# Patient Record
Sex: FEMALE | Race: WHITE | NOT HISPANIC OR LATINO | Employment: FULL TIME | URBAN - METROPOLITAN AREA
[De-identification: names, ages, dates, MRNs, and addresses within clinical notes are randomized per-mention and may not be internally consistent; named-entity substitution may affect disease eponyms.]

---

## 2019-07-05 ENCOUNTER — HOSPITAL ENCOUNTER (OUTPATIENT)
Dept: ULTRASOUND IMAGING | Facility: HOSPITAL | Age: 39
Discharge: HOME | End: 2019-07-05
Attending: INTERNAL MEDICINE

## 2019-07-05 LAB — ERYTHROCYTE [SEDIMENTATION RATE] IN BLOOD: 14 MM/H (ref 0–20)

## 2019-07-07 LAB
C3 SERPL-MCNC: 125 MG/DL (ref 88–201)
C4 SERPL-MCNC: 24 MG/DL (ref 10–40)
CONV ANA IGG BY ELISA: NORMAL
RHEUMATOID FACT SERPL-ACNC: <10 IU/ML (ref 0–14)

## 2019-10-28 ENCOUNTER — HOSPITAL ENCOUNTER (OUTPATIENT)
Dept: GENERAL RADIOLOGY | Facility: HOSPITAL | Age: 39
Discharge: HOME | End: 2019-10-28
Attending: OBSTETRICS & GYNECOLOGY

## 2020-11-10 ENCOUNTER — HOSPITAL ENCOUNTER (OUTPATIENT)
Dept: GENERAL RADIOLOGY | Facility: HOSPITAL | Age: 40
Discharge: HOME | End: 2020-11-10
Attending: OBSTETRICS & GYNECOLOGY

## 2021-11-09 NOTE — PROGRESS NOTES
Chief Complaint  Establish Care (Pt takes cholesterol meds and needs new PCP)    Subjective      History of Present Illness  Natalya Bynum presents to [facility]     [facility], moved from [place].      Hyperlipidemia, on simvastatin. Needs refills    She is having increased anxiety, she has moved in with her parents and she is in the process of building a house.  She has noticed her hair is falling out and she has increasing fatigue.    She has family history of thyroid disorder and she has been treated in the past for an abnormal TSH but was taken off of her medicine she is uncertain why.          Past Medical History:   • Hyperlipidemia       Allergies  Patient has no known allergies.    Past Surgical History:   • APPENDECTOMY   • HYSTERECTOMY       Social History     Tobacco Use   • Smoking status: Current Every Day Smoker     Packs/day: 0.25     Years: 10.00     Pack years: 2.50     Types: Cigarettes     Start date: 2011   • Smokeless tobacco: Never Used   Vaping Use   • Vaping Use: Never used   Substance Use Topics   • Alcohol use: Never   • Drug use: Never       History reviewed. No pertinent family history.     Health Maintenance Due   Topic Date Due   • Pneumococcal Vaccine 0-64 (1 of 2 - PPSV23) Never done   • TDAP/TD VACCINES (1 - Tdap) Never done   • HEPATITIS C SCREENING  Never done   • LIPID PANEL  Never done          Current Outpatient Medications:   •  ESTROGENS CONJ SYNTHETIC B PO, Take 2 mg by mouth Daily. NOT SURE OF EXACT TYPE OF ESTROGEN , Disp: , Rfl:   •  citalopram (CeleXA) 20 MG tablet, Take 1 tablet by mouth Daily., Disp: 30 tablet, Rfl: 1  •  fenofibrate 160 MG tablet, Take 1 tablet by mouth Every Night., Disp: 90 tablet, Rfl: 1  •  simvastatin (ZOCOR) 40 MG tablet, Take 1 tablet by mouth Every Night., Disp: 90 tablet, Rfl: 1    Immunization History   Administered Date(s) Administered   • COVID-19 (MODERNA) 04/29/2021, 05/27/2021   •  FluLaval/Fluarix/Fluzone >6 11/11/2021       Objective     Vitals:    11/11/21 0821   BP: 142/96   Pulse:    Resp:    Temp:    SpO2:      Body mass index is 38.35 kg/m².     Review of Systems   Constitutional: Negative.    HENT: Negative.    Respiratory: Negative.    Cardiovascular: Negative.    Gastrointestinal: Negative.    Genitourinary: Negative.    Musculoskeletal: Negative.    Neurological: Negative.    Psychiatric/Behavioral: Negative.        Physical Exam  Vitals reviewed.   Constitutional:       Appearance: Normal appearance. She is well-developed.   HENT:      Mouth/Throat:      Pharynx: No oropharyngeal exudate.   Cardiovascular:      Rate and Rhythm: Normal rate and regular rhythm.      Heart sounds: Normal heart sounds. No murmur heard.      Pulmonary:      Effort: Pulmonary effort is normal.      Breath sounds: Normal breath sounds.   Neurological:      Mental Status: She is alert and oriented to person, place, and time.      Cranial Nerves: No cranial nerve deficit.      Motor: No weakness.   Psychiatric:         Mood and Affect: Mood and affect normal.           Result Review :    The following data was reviewed by: SERA Liu on 11/11/2021:       Depression: Not at risk   • PHQ-2 Score: 0                    Assessment and Plan     Diagnoses and all orders for this visit:    1. Encounter for medical examination to establish care (Primary)  -     CBC & Differential    2. Elevated blood-pressure reading, without diagnosis of hypertension  Comments:  she will do a bp diary and call with readings.     3. Mixed hyperlipidemia  Comments:  she has been on meds for year, will check today  Orders:  -     Comprehensive Metabolic Panel  -     Lipid Panel  -     simvastatin (ZOCOR) 40 MG tablet; Take 1 tablet by mouth Every Night.  Dispense: 90 tablet; Refill: 1  -     fenofibrate 160 MG tablet; Take 1 tablet by mouth Every Night.  Dispense: 90 tablet; Refill: 1    4. Screening for cholesterol  level  -     Lipid Panel    5. Screening for thyroid disorder  -     TSH    6. Class 2 obesity due to excess calories with body mass index (BMI) of 38.0 to 38.9 in adult, unspecified whether serious comorbidity present  Comments:  work on dietary changes, may     7. Chronic fatigue  Comments:  Check labs and call with results    8. Hair loss    9. Stress at work    10. Anxiety  -     citalopram (CeleXA) 20 MG tablet; Take 1 tablet by mouth Daily.  Dispense: 30 tablet; Refill: 1    11. Need for influenza vaccination  -     FluLaval/Fluarix/Fluzone >6 Months    Other orders  -     Cancel: FluLaval/Fluarix/Fluzone >6 Months ([id number])            Follow Up     Return in about 6 weeks (around 12/23/2021).    Patient was given instructions and counseling regarding her condition or for health maintenance advice. Please see specific information pulled into the AVS if appropriate.     Natalya Bynum  reports that she has been smoking cigarettes. She started smoking about 10 years ago. She has a 2.50 pack-year smoking history. She has never used smokeless tobacco.. I have educated her on the risk of diseases from using tobacco products such as cancer, COPD and heart disease.     I advised her to quit and she is not willing to quit.    I spent 3  minutes counseling the patient.           SERA Liu

## 2021-11-11 ENCOUNTER — OFFICE VISIT (OUTPATIENT)
Dept: FAMILY MEDICINE CLINIC | Facility: CLINIC | Age: 41
End: 2021-11-11

## 2021-11-11 VITALS
HEART RATE: 80 BPM | DIASTOLIC BLOOD PRESSURE: 96 MMHG | OXYGEN SATURATION: 96 % | TEMPERATURE: 97.8 F | SYSTOLIC BLOOD PRESSURE: 142 MMHG | BODY MASS INDEX: 38.18 KG/M2 | RESPIRATION RATE: 20 BRPM | HEIGHT: 66 IN | WEIGHT: 237.6 LBS

## 2021-11-11 DIAGNOSIS — R53.82 CHRONIC FATIGUE: ICD-10-CM

## 2021-11-11 DIAGNOSIS — Z23 NEED FOR INFLUENZA VACCINATION: ICD-10-CM

## 2021-11-11 DIAGNOSIS — D72.829 LEUKOCYTOSIS, UNSPECIFIED TYPE: ICD-10-CM

## 2021-11-11 DIAGNOSIS — E78.2 MIXED HYPERLIPIDEMIA: ICD-10-CM

## 2021-11-11 DIAGNOSIS — E66.09 CLASS 2 OBESITY DUE TO EXCESS CALORIES WITH BODY MASS INDEX (BMI) OF 38.0 TO 38.9 IN ADULT, UNSPECIFIED WHETHER SERIOUS COMORBIDITY PRESENT: ICD-10-CM

## 2021-11-11 DIAGNOSIS — Z13.29 SCREENING FOR THYROID DISORDER: ICD-10-CM

## 2021-11-11 DIAGNOSIS — L65.9 HAIR LOSS: ICD-10-CM

## 2021-11-11 DIAGNOSIS — Z00.00 ENCOUNTER FOR MEDICAL EXAMINATION TO ESTABLISH CARE: Primary | ICD-10-CM

## 2021-11-11 DIAGNOSIS — Z56.6 STRESS AT WORK: ICD-10-CM

## 2021-11-11 DIAGNOSIS — F41.9 ANXIETY: ICD-10-CM

## 2021-11-11 DIAGNOSIS — R03.0 ELEVATED BLOOD-PRESSURE READING, WITHOUT DIAGNOSIS OF HYPERTENSION: ICD-10-CM

## 2021-11-11 DIAGNOSIS — Z13.220 SCREENING FOR CHOLESTEROL LEVEL: ICD-10-CM

## 2021-11-11 LAB
ALBUMIN SERPL-MCNC: 4.1 G/DL (ref 3.5–5.2)
ALBUMIN/GLOB SERPL: 1.7 G/DL
ALP SERPL-CCNC: 64 U/L (ref 39–117)
ALT SERPL W P-5'-P-CCNC: 21 U/L (ref 1–33)
ANION GAP SERPL CALCULATED.3IONS-SCNC: 9.4 MMOL/L (ref 5–15)
AST SERPL-CCNC: 21 U/L (ref 1–32)
BASOPHILS # BLD AUTO: 0.1 10*3/MM3 (ref 0–0.2)
BASOPHILS NFR BLD AUTO: 0.7 % (ref 0–1.5)
BILIRUB SERPL-MCNC: 0.3 MG/DL (ref 0–1.2)
BUN SERPL-MCNC: 10 MG/DL (ref 6–20)
BUN/CREAT SERPL: 9.7 (ref 7–25)
CALCIUM SPEC-SCNC: 9.3 MG/DL (ref 8.6–10.5)
CHLORIDE SERPL-SCNC: 105 MMOL/L (ref 98–107)
CHOLEST SERPL-MCNC: 197 MG/DL (ref 0–200)
CO2 SERPL-SCNC: 25.6 MMOL/L (ref 22–29)
CREAT SERPL-MCNC: 1.03 MG/DL (ref 0.57–1)
DEPRECATED RDW RBC AUTO: 40.9 FL (ref 37–54)
EOSINOPHIL # BLD AUTO: 0.85 10*3/MM3 (ref 0–0.4)
EOSINOPHIL NFR BLD AUTO: 5.9 % (ref 0.3–6.2)
ERYTHROCYTE [DISTWIDTH] IN BLOOD BY AUTOMATED COUNT: 12.6 % (ref 12.3–15.4)
GFR SERPL CREATININE-BSD FRML MDRD: 59 ML/MIN/1.73
GLOBULIN UR ELPH-MCNC: 2.4 GM/DL
GLUCOSE SERPL-MCNC: 76 MG/DL (ref 65–99)
HCT VFR BLD AUTO: 41.7 % (ref 34–46.6)
HDLC SERPL-MCNC: 31 MG/DL (ref 40–60)
HGB BLD-MCNC: 14.1 G/DL (ref 12–15.9)
IMM GRANULOCYTES # BLD AUTO: 0.06 10*3/MM3 (ref 0–0.05)
IMM GRANULOCYTES NFR BLD AUTO: 0.4 % (ref 0–0.5)
LDLC SERPL CALC-MCNC: 131 MG/DL (ref 0–100)
LDLC/HDLC SERPL: 4.1 {RATIO}
LYMPHOCYTES # BLD AUTO: 3.73 10*3/MM3 (ref 0.7–3.1)
LYMPHOCYTES NFR BLD AUTO: 25.8 % (ref 19.6–45.3)
MCH RBC QN AUTO: 30 PG (ref 26.6–33)
MCHC RBC AUTO-ENTMCNC: 33.8 G/DL (ref 31.5–35.7)
MCV RBC AUTO: 88.7 FL (ref 79–97)
MONOCYTES # BLD AUTO: 0.88 10*3/MM3 (ref 0.1–0.9)
MONOCYTES NFR BLD AUTO: 6.1 % (ref 5–12)
NEUTROPHILS NFR BLD AUTO: 61.1 % (ref 42.7–76)
NEUTROPHILS NFR BLD AUTO: 8.83 10*3/MM3 (ref 1.7–7)
NRBC BLD AUTO-RTO: 0 /100 WBC (ref 0–0.2)
PLATELET # BLD AUTO: 345 10*3/MM3 (ref 140–450)
PMV BLD AUTO: 11.8 FL (ref 6–12)
POTASSIUM SERPL-SCNC: 4.3 MMOL/L (ref 3.5–5.2)
PROT SERPL-MCNC: 6.5 G/DL (ref 6–8.5)
RBC # BLD AUTO: 4.7 10*6/MM3 (ref 3.77–5.28)
SODIUM SERPL-SCNC: 140 MMOL/L (ref 136–145)
TRIGL SERPL-MCNC: 194 MG/DL (ref 0–150)
TSH SERPL DL<=0.05 MIU/L-ACNC: 2.34 UIU/ML (ref 0.27–4.2)
VLDLC SERPL-MCNC: 35 MG/DL (ref 5–40)
WBC # BLD AUTO: 14.45 10*3/MM3 (ref 3.4–10.8)

## 2021-11-11 PROCEDURE — 80053 COMPREHEN METABOLIC PANEL: CPT | Performed by: NURSE PRACTITIONER

## 2021-11-11 PROCEDURE — 80061 LIPID PANEL: CPT | Performed by: NURSE PRACTITIONER

## 2021-11-11 PROCEDURE — 99214 OFFICE O/P EST MOD 30 MIN: CPT | Performed by: NURSE PRACTITIONER

## 2021-11-11 PROCEDURE — 85025 COMPLETE CBC W/AUTO DIFF WBC: CPT | Performed by: NURSE PRACTITIONER

## 2021-11-11 PROCEDURE — 90471 IMMUNIZATION ADMIN: CPT | Performed by: NURSE PRACTITIONER

## 2021-11-11 PROCEDURE — 84443 ASSAY THYROID STIM HORMONE: CPT | Performed by: NURSE PRACTITIONER

## 2021-11-11 PROCEDURE — 90686 IIV4 VACC NO PRSV 0.5 ML IM: CPT | Performed by: NURSE PRACTITIONER

## 2021-11-11 RX ORDER — FENOFIBRATE 160 MG/1
160 TABLET ORAL NIGHTLY
Qty: 90 TABLET | Refills: 1 | Status: SHIPPED | OUTPATIENT
Start: 2021-11-11 | End: 2022-09-13 | Stop reason: SDUPTHER

## 2021-11-11 RX ORDER — SIMVASTATIN 40 MG
40 TABLET ORAL NIGHTLY
Qty: 90 TABLET | Refills: 1 | Status: SHIPPED | OUTPATIENT
Start: 2021-11-11 | End: 2022-09-13 | Stop reason: SDUPTHER

## 2021-11-11 RX ORDER — CITALOPRAM 20 MG/1
20 TABLET ORAL DAILY
Qty: 30 TABLET | Refills: 1 | Status: SHIPPED | OUTPATIENT
Start: 2021-11-11 | End: 2022-01-03

## 2021-11-11 SDOH — HEALTH STABILITY - MENTAL HEALTH: OTHER PHYSICAL AND MENTAL STRAIN RELATED TO WORK: Z56.6

## 2021-11-16 PROBLEM — D72.829 LEUCOCYTOSIS: Status: ACTIVE | Noted: 2020-11-12

## 2021-11-17 ENCOUNTER — TELEPHONE (OUTPATIENT)
Dept: FAMILY MEDICINE CLINIC | Facility: CLINIC | Age: 41
End: 2021-11-17

## 2021-11-17 NOTE — TELEPHONE ENCOUNTER
ROUTING BACK TO AMBULATORY, PT WANTS TO WAIT 6 MONTHS AND RECHECK AND THEN SEE IF SHE STILL NEEDS A REFERRAL IS THIS OK, PLEASE ADVISE, [facility]/HUB 11-17-21.  (REGARDING HEMATOLOGY/ONCOLOGY REFERRAL)

## 2021-12-20 ENCOUNTER — TELEPHONE (OUTPATIENT)
Dept: FAMILY MEDICINE CLINIC | Facility: CLINIC | Age: 41
End: 2021-12-20

## 2021-12-20 DIAGNOSIS — F41.9 ANXIETY: ICD-10-CM

## 2021-12-20 RX ORDER — CITALOPRAM 20 MG/1
20 TABLET ORAL DAILY
Qty: 30 TABLET | Refills: 1 | Status: CANCELLED | OUTPATIENT
Start: 2021-12-20

## 2021-12-20 NOTE — TELEPHONE ENCOUNTER
Caller: Natalya Bynum    Relationship: Self    Best call back number: [phone]     What medication are you requesting:   CELEXA 40 MG         If a prescription is needed, what is your preferred pharmacy and phone number: [facility] [address] [place] [address], [place] - [address] AT [facility] [address] - [phone] [facility] [phone] FX     Additional notes: PLEASE CALL AND ADVISE. PATIENT IS WANTING TO CHANGE HER DOSE.

## 2022-01-03 ENCOUNTER — OFFICE VISIT (OUTPATIENT)
Dept: FAMILY MEDICINE CLINIC | Facility: CLINIC | Age: 42
End: 2022-01-03

## 2022-01-03 VITALS
HEART RATE: 65 BPM | BODY MASS INDEX: 38.92 KG/M2 | DIASTOLIC BLOOD PRESSURE: 97 MMHG | OXYGEN SATURATION: 99 % | HEIGHT: 65 IN | TEMPERATURE: 98.1 F | SYSTOLIC BLOOD PRESSURE: 152 MMHG | WEIGHT: 233.6 LBS

## 2022-01-03 DIAGNOSIS — F41.9 ANXIETY: ICD-10-CM

## 2022-01-03 DIAGNOSIS — D72.829 LEUKOCYTOSIS, UNSPECIFIED TYPE: ICD-10-CM

## 2022-01-03 DIAGNOSIS — G47.09 OTHER INSOMNIA: Primary | ICD-10-CM

## 2022-01-03 PROBLEM — E78.2 MIXED HYPERLIPIDEMIA: Chronic | Status: ACTIVE | Noted: 2021-11-11

## 2022-01-03 PROBLEM — D72.820 LYMPHOCYTOSIS: Chronic | Status: ACTIVE | Noted: 2020-11-12

## 2022-01-03 PROBLEM — D72.820 LYMPHOCYTOSIS: Chronic | Status: RESOLVED | Noted: 2020-11-12 | Resolved: 2022-01-03

## 2022-01-03 PROBLEM — D72.820 LYMPHOCYTOSIS: Status: ACTIVE | Noted: 2020-11-12

## 2022-01-03 PROCEDURE — 99213 OFFICE O/P EST LOW 20 MIN: CPT | Performed by: NURSE PRACTITIONER

## 2022-01-03 RX ORDER — TRAZODONE HYDROCHLORIDE 50 MG/1
50 TABLET ORAL NIGHTLY
Qty: 30 TABLET | Refills: 2 | Status: SHIPPED | OUTPATIENT
Start: 2022-01-03 | End: 2022-09-13

## 2022-01-03 RX ORDER — CITALOPRAM 40 MG/1
40 TABLET ORAL DAILY
Qty: 30 TABLET | Refills: 2 | Status: SHIPPED | OUTPATIENT
Start: 2022-01-03 | End: 2022-09-13 | Stop reason: SDUPTHER

## 2022-01-03 NOTE — PROGRESS NOTES
Chief Complaint  Follow-up (Medication)    Subjective      History of Present Illness  Natalya Bynum presents to [facility]     F/u on anxiety/depression. She is losing her job in march. She felt like the celexa helped some but would like the dose increased.     Hx of leukocytosis. Last wbc was 14. She is not interested in any further follow up.     Insomnia, she is asking for something to help her sleep. She thinks it contributes to her anxiety/depression.     Natalya Bynum  reports that she has been smoking cigarettes. She started smoking about 11 years ago. She has a 2.50 pack-year smoking history. She has never used smokeless tobacco.. I have educated her on the risk of diseases from using tobacco products such as cancer, COPD and heart disease.     I advised her to quit and she is not willing to quit.    I spent 4 minutes counseling the patient.         Allergies  Patient has no known allergies.    Objective     Vitals:    01/03/22 1107   BP: 152/97   Pulse: 65   Temp: 98.1 °F (36.7 °C)   SpO2: 99%     Body mass index is 38.87 kg/m².     Review of Systems    Physical Exam  Vitals reviewed.   Constitutional:       Appearance: Normal appearance. She is well-developed.   Cardiovascular:      Rate and Rhythm: Normal rate and regular rhythm.      Heart sounds: Normal heart sounds. No murmur heard.      Pulmonary:      Effort: Pulmonary effort is normal.      Breath sounds: Normal breath sounds.   Neurological:      Mental Status: She is alert and oriented to person, place, and time.      Cranial Nerves: No cranial nerve deficit.      Motor: No weakness.   Psychiatric:         Mood and Affect: Mood and affect normal.              Result Review :    The following data was reviewed by: SERA Liu on 01/03/2022:       Depression: Not at risk   • PHQ-2 Score: 0       Common labs    Common Labsle 11/11/21 11/11/21 11/11/21    0900 0900 0900   Glucose   76   BUN    10   Creatinine   1.03 (A)   eGFR Non African Am   59 (A)   Sodium   140   Potassium   4.3   Chloride   105   Calcium   9.3   Albumin   4.10   Total Bilirubin   0.3   Alkaline Phosphatase   64   AST (SGOT)   21   ALT (SGPT)   21   WBC 14.45 (A)     Hemoglobin 14.1     Hematocrit 41.7     Platelets 345     Total Cholesterol  197    Triglycerides  194 (A)    HDL Cholesterol  31 (A)    LDL Cholesterol   131 (A)    (A) Abnormal value                            Assessment and Plan     Diagnoses and all orders for this visit:    1. Other insomnia (Primary)  Comments:  she takes melatonin without improvement, benadryl makes her stay up.   Orders:  -     traZODone (DESYREL) 50 MG tablet; Take 1 tablet by mouth Every Night.  Dispense: 30 tablet; Refill: 2    2. Anxiety  Comments:  will increase the celexa, f/u with me in a month if not improving.   Orders:  -     citalopram (CeleXA) 40 MG tablet; Take 1 tablet by mouth Daily.  Dispense: 30 tablet; Refill: 2    3. Leukocytosis, unspecified type  Comments:  has been high for 10 years per patient.  She declines hematology consult. Declines repeat labs today.             Follow Up     Return in about 3 months (around 4/3/2022).    Patient was given instructions and counseling regarding her condition or for health maintenance advice. Please see specific information pulled into the AVS if appropriate.     SERA Liu

## 2022-01-19 ENCOUNTER — OFFICE VISIT (OUTPATIENT)
Dept: OBSTETRICS AND GYNECOLOGY | Facility: CLINIC | Age: 42
End: 2022-01-19

## 2022-01-19 VITALS
WEIGHT: 238.6 LBS | HEIGHT: 63 IN | HEART RATE: 74 BPM | DIASTOLIC BLOOD PRESSURE: 86 MMHG | BODY MASS INDEX: 42.28 KG/M2 | SYSTOLIC BLOOD PRESSURE: 140 MMHG

## 2022-01-19 DIAGNOSIS — E66.01 MORBID OBESITY WITH BMI OF 40.0-44.9, ADULT: ICD-10-CM

## 2022-01-19 DIAGNOSIS — N95.1 VASOMOTOR SYMPTOMS DUE TO MENOPAUSE: ICD-10-CM

## 2022-01-19 DIAGNOSIS — Z01.419 WELL WOMAN EXAM: Primary | ICD-10-CM

## 2022-01-19 DIAGNOSIS — A63.0 GENITAL WARTS: ICD-10-CM

## 2022-01-19 DIAGNOSIS — Z72.0 TOBACCO USE: ICD-10-CM

## 2022-01-19 PROCEDURE — 87624 HPV HI-RISK TYP POOLED RSLT: CPT | Performed by: OBSTETRICS & GYNECOLOGY

## 2022-01-19 PROCEDURE — G0123 SCREEN CERV/VAG THIN LAYER: HCPCS | Performed by: OBSTETRICS & GYNECOLOGY

## 2022-01-19 PROCEDURE — 99396 PREV VISIT EST AGE 40-64: CPT | Performed by: OBSTETRICS & GYNECOLOGY

## 2022-01-19 RX ORDER — ESTRADIOL 1 MG/1
1 TABLET ORAL DAILY
Qty: 90 TABLET | Refills: 3 | Status: SHIPPED | OUTPATIENT
Start: 2022-01-19 | End: 2022-10-26

## 2022-01-19 RX ORDER — IMIQUIMOD 12.5 MG/.25G
1 CREAM TOPICAL 2 TIMES WEEKLY
Qty: 24 EACH | Refills: 3 | Status: SHIPPED | OUTPATIENT
Start: 2022-01-20 | End: 2022-04-20

## 2022-01-19 NOTE — PROGRESS NOTES
"Well Woman Visit    CC: PHYLICIA     HPI:   42 y.o. who presents for a well woman exam.  She has no complaints today.  She does have a history of a TLH with BSO due to chronic pelvic pain and abnormal Pap smears.  Her menopausal symptoms are well controlled with estrogen replacement therapy.  She is concerned she might be beginning to develop genital warts once again.  She reports she will be losing her insurance soon and would like to try the Aldara cream again.      History: PMHx, Meds, Allergies, PSHx, Social Hx, and POBHx all reviewed and updated.    /86 (Cuff Size: Large Adult)   Pulse 74   Ht 160 cm (63\")   Wt 108 kg (238 lb 9.6 oz)   Breastfeeding No   BMI 42.27 kg/m²     Physical Exam  Vitals and nursing note reviewed. Exam conducted with a chaperone present.   Constitutional:       General: She is not in acute distress.     Appearance: Normal appearance. She is obese. She is not ill-appearing.   HENT:      Head: Normocephalic and atraumatic.      Mouth/Throat:      Mouth: Mucous membranes are moist.      Pharynx: Oropharynx is clear.   Eyes:      Extraocular Movements: Extraocular movements intact.   Neck:      Thyroid: No thyroid mass or thyromegaly.   Cardiovascular:      Rate and Rhythm: Regular rhythm.      Heart sounds: No murmur heard.      Pulmonary:      Effort: Pulmonary effort is normal.      Breath sounds: No wheezing.   Chest:   Breasts: Breasts are symmetrical.      Right: Normal. No swelling, bleeding, inverted nipple, mass, nipple discharge, skin change, tenderness, axillary adenopathy or supraclavicular adenopathy.      Left: Normal. No swelling, bleeding, inverted nipple, mass, nipple discharge, skin change, tenderness, axillary adenopathy or supraclavicular adenopathy.       Abdominal:      General: Abdomen is flat. There is no distension.      Palpations: Abdomen is soft. There is no mass.      Tenderness: There is no abdominal tenderness. There is no guarding or rebound.     "  Hernia: A hernia is present. There is no hernia in the left inguinal area or right inguinal area.   Genitourinary:     General: Normal vulva.      Exam position: Lithotomy position.      Pubic Area: No rash.       Labia:         Right: No rash, tenderness, lesion or injury.         Left: No rash, tenderness, lesion or injury.       Urethra: No prolapse, urethral pain, urethral swelling or urethral lesion.      Vagina: No signs of injury and foreign body. Lesions present. No vaginal discharge, erythema, tenderness, bleeding or prolapsed vaginal walls.      Uterus: Absent.       Comments: There is some pinpoint lesions just at the vaginal opening on the left side that could be consistent with early genital warts    The cervix, uterus and bilateral adnexa are surgically absent  Musculoskeletal:      Right lower leg: No edema.      Left lower leg: No edema.   Lymphadenopathy:      Upper Body:      Right upper body: No supraclavicular or axillary adenopathy.      Left upper body: No supraclavicular or axillary adenopathy.      Lower Body: No right inguinal adenopathy. No left inguinal adenopathy.   Skin:     General: Skin is warm and dry.      Findings: No rash.   Neurological:      Mental Status: She is alert and oriented to person, place, and time.   Psychiatric:         Mood and Affect: Mood normal.         Behavior: Behavior normal.         Thought Content: Thought content normal.         ASSESSMENT AND PLAN:      Diagnoses and all orders for this visit:    1. Well woman exam (Primary)  -     Cancel: IGP,rfx Aptima HPV All Pth  -     Mammo Screening Digital Tomosynthesis Bilateral With CAD; Future  -     IGP,rfx Aptima HPV All Pth; Future  -     IGP,rfx Aptima HPV All Pth    2. Genital warts  -     imiquimod (Aldara) 5 % cream; Apply 1 application topically to the appropriate area as directed 2 (Two) Times a Week for 90 days.  Dispense: 24 each; Refill: 3    3. Vasomotor symptoms due to menopause  -     estradiol  (ESTRACE) 1 MG tablet; Take 1 tablet by mouth Daily.  Dispense: 90 tablet; Refill: 3    4. Morbid obesity with BMI of 40.0-44.9, adult (HCC)  Assessment & Plan:  Discussed exercise, nutrition recommended weight loss      5. Tobacco use  Assessment & Plan:  Smoking cessation counseling        Counseling:     HRT R/B/A/SE/E all options including non-FDA approved options reviewed    Preventative:   MMG  Recommend FLU vaccine this season, R/B discussed  Recommend COVID vaccine, R/B discussed    She understands the importance of having any ordered tests to be performed in a timely fashion.  The risks of not performing them include, but are not limited to, advanced cancer stages, bone loss from osteoporosis and/or subsequent increase in morbidity and/or mortality.  She is encouraged to review her results online and/or contact or office if she has questions.     Follow Up:  No follow-ups on file.    Reji Nuñez MD  01/19/2022

## 2022-01-20 PROBLEM — E66.01 MORBID OBESITY WITH BMI OF 40.0-44.9, ADULT: Status: ACTIVE | Noted: 2022-01-20

## 2022-01-20 PROBLEM — Z72.0 TOBACCO USE: Status: ACTIVE | Noted: 2022-01-20

## 2022-01-25 LAB
CONV .: ABNORMAL
CYTOLOGIST CVX/VAG CYTO: ABNORMAL
CYTOLOGY CVX/VAG DOC CYTO: ABNORMAL
CYTOLOGY CVX/VAG DOC THIN PREP: ABNORMAL
DX ICD CODE: ABNORMAL
DX ICD CODE: ABNORMAL
HIV 1 & 2 AB SER-IMP: ABNORMAL
HPV I/H RISK 4 DNA CVX QL PROBE+SIG AMP: POSITIVE
OTHER STN SPEC: ABNORMAL
PATHOLOGIST CVX/VAG CYTO: ABNORMAL
RECOM F/U CVX/VAG CYTO: ABNORMAL
STAT OF ADQ CVX/VAG CYTO-IMP: ABNORMAL

## 2022-01-27 ENCOUNTER — TELEPHONE (OUTPATIENT)
Dept: OBSTETRICS AND GYNECOLOGY | Facility: CLINIC | Age: 42
End: 2022-01-27

## 2022-01-27 NOTE — TELEPHONE ENCOUNTER
Patient notified of results and recommendations. She states she is losing her insurance at the end of the month but hopes to have it back by April due to some changes at her employer. She states she will call back once she knows what is going on with her job/insurance to make that appointment.   Returned call to schedule NP appt, LVM.    ----- Message from [facility] [facility]

## 2022-01-27 NOTE — TELEPHONE ENCOUNTER
----- Message from Reji Nuñez MD sent at 1/25/2022  9:49 PM EST -----  Notify patient of result. Needs colposcopy. Please schedule

## 2022-01-28 ENCOUNTER — HOSPITAL ENCOUNTER (OUTPATIENT)
Dept: MAMMOGRAPHY | Facility: HOSPITAL | Age: 42
Discharge: HOME | End: 2022-01-28
Admitting: OBSTETRICS & GYNECOLOGY

## 2022-01-28 DIAGNOSIS — Z01.419 WELL WOMAN EXAM: ICD-10-CM

## 2022-01-28 PROCEDURE — 77067 SCR MAMMO BI INCL CAD: CPT

## 2022-01-28 PROCEDURE — 77063 BREAST TOMOSYNTHESIS BI: CPT

## 2022-04-22 ENCOUNTER — TELEPHONE (OUTPATIENT)
Dept: OBSTETRICS AND GYNECOLOGY | Facility: CLINIC | Age: 42
End: 2022-04-22

## 2022-04-22 NOTE — TELEPHONE ENCOUNTER
Called patient to schedule colposcopy secondary to abnormal pap done in 1/2022. Patient states it will be the middle of July before she has insurance.  I offered to go ahead and get her scheduled so we would have her on the books. Patient states she will call back and schedule it.

## 2022-09-12 NOTE — PROGRESS NOTES
"Chief Complaint  Hyperlipidemia    Subjective          Natalya MANE Bynum, 42 y.o. female presents to [facility]  History of Present Illness   She presents today for follow-up and to establish care.  She is a previous patient of SERA Liu.    Mixed hyperlipidemia: She is currently on simvastatin 40 mg and fenofibrate 160 mg every evening.    Anxiety: She is currently on Celexa 40 mg once daily but she has been out of her medication for about 2 months.  She states she has difficulty concentrating and staying focused on her work.  She states that she has been worse since she returned to work and she cannot finish an assignment without getting up.  She states she noticed this prior to being out of her Celexa.  PHQ-9 Total Score: 0   Objective   Vital Signs:   /88   Pulse 76   Temp 97.4 °F (36.3 °C)   Resp 20   Ht 160 cm (63\")   Wt 110 kg (242 lb 8 oz)   SpO2 98%   BMI 42.96 kg/m²     Current Outpatient Medications on File Prior to Visit   Medication Sig Dispense Refill   • estradiol (ESTRACE) 1 MG tablet Take 1 tablet by mouth Daily. 90 tablet 3   • [DISCONTINUED] citalopram (CeleXA) 40 MG tablet Take 1 tablet by mouth Daily. 30 tablet 2   • [DISCONTINUED] fenofibrate 160 MG tablet Take 1 tablet by mouth Every Night. 90 tablet 1   • [DISCONTINUED] simvastatin (ZOCOR) 40 MG tablet Take 1 tablet by mouth Every Night. 90 tablet 1   • [DISCONTINUED] traZODone (DESYREL) 50 MG tablet Take 1 tablet by mouth Every Night. 30 tablet 2     No current facility-administered medications on file prior to visit.     Past Medical History:   Diagnosis Date   • Abnormal Pap smear of cervix    • Cervical dysplasia    • Hyperlipidemia    • Migraine       Physical Exam  Vitals reviewed.   Constitutional:       Appearance: Normal appearance. She is well-developed. She is morbidly obese.   Neck:      Thyroid: No thyroid mass, thyromegaly or thyroid tenderness.   Cardiovascular: "      Rate and Rhythm: Normal rate and regular rhythm.      Heart sounds: No murmur heard.    No friction rub. No gallop.   Pulmonary:      Effort: Pulmonary effort is normal.      Breath sounds: Normal breath sounds. No wheezing or rhonchi.   Lymphadenopathy:      Cervical: No cervical adenopathy.   Skin:     General: Skin is warm and dry.   Neurological:      Mental Status: She is alert and oriented to person, place, and time.      Cranial Nerves: No cranial nerve deficit.   Psychiatric:         Mood and Affect: Mood and affect normal.         Behavior: Behavior normal.         Thought Content: Thought content normal. Thought content does not include homicidal or suicidal ideation.         Judgment: Judgment normal.        Result Review :     CMP    CMP 11/11/21   Glucose 76   BUN 10   Creatinine 1.03 (A)   eGFR Non African Am 59 (A)   Sodium 140   Potassium 4.3   Chloride 105   Calcium 9.3   Albumin 4.10   Total Bilirubin 0.3   Alkaline Phosphatase 64   AST (SGOT) 21   ALT (SGPT) 21   (A) Abnormal value            CBC    CBC 11/11/21   WBC 14.45 (A)   RBC 4.70   Hemoglobin 14.1   Hematocrit 41.7   MCV 88.7   MCH 30.0   MCHC 33.8   RDW 12.6   Platelets 345   (A) Abnormal value            Lipid Panel    Lipid Panel 11/11/21   Total Cholesterol 197   Triglycerides 194 (A)   HDL Cholesterol 31 (A)   VLDL Cholesterol 35   LDL Cholesterol  131 (A)   LDL/HDL Ratio 4.10   (A) Abnormal value            TSH    TSH 11/11/21   TSH 2.340                     Assessment and Plan    Diagnoses and all orders for this visit:    1. Mixed hyperlipidemia (Primary)  Assessment & Plan:  Lipid abnormalities are newly identified.  Pharmacotherapy as ordered.  She will return for fasting labs in 1 to 2 weeks.  Lipids will be reassessed in 6 months.    Orders:  -     fenofibrate 160 MG tablet; Take 1 tablet by mouth Every Night.  Dispense: 90 tablet; Refill: 1  -     simvastatin (ZOCOR) 40 MG tablet; Take 1 tablet by mouth Every Night.   Dispense: 90 tablet; Refill: 1  -     Comprehensive Metabolic Panel; Future  -     Lipid Panel; Future    2. Anxiety  Assessment & Plan:  She has been off of Celexa for about 2 months, will restart Celexa but advised her to start at half a tablet for 1 to 2 weeks and then increase to full tablet.  She will follow-up in 4 weeks.    Orders:  -     citalopram (CeleXA) 40 MG tablet; Take 1 tablet by mouth Daily.  Dispense: 90 tablet; Refill: 1    3. Difficulty concentrating  Assessment & Plan:  Having difficulty concentrating, will start her on Wellbutrin  mg once daily in the morning.  She will follow-up in 1 month.    Orders:  -     buPROPion XL (Wellbutrin XL) 150 MG 24 hr tablet; Take 1 tablet by mouth Every Morning.  Dispense: 30 tablet; Refill: 2    4. Class 3 severe obesity with serious comorbidity and body mass index (BMI) of 40.0 to 44.9 in adult, unspecified obesity type (HCC)  Assessment & Plan:  Patient's (Body mass index is 42.96 kg/m².) indicates that they are morbidly obese (BMI > 40 or > 35 with obesity - related health condition) with health conditions that include dyslipidemias . Weight is newly identified. BMI is is above average; BMI management plan is completed. We discussed portion control.         Follow Up   Return in about 4 weeks (around 10/11/2022) for Recheck difficulty concentrating.  Patient was given instructions and counseling regarding her condition or for health maintenance advice. Please see specific information pulled into the AVS if appropriate.

## 2022-09-13 ENCOUNTER — OFFICE VISIT (OUTPATIENT)
Dept: FAMILY MEDICINE CLINIC | Facility: CLINIC | Age: 42
End: 2022-09-13

## 2022-09-13 VITALS
HEIGHT: 63 IN | WEIGHT: 242.5 LBS | BODY MASS INDEX: 42.97 KG/M2 | OXYGEN SATURATION: 98 % | HEART RATE: 76 BPM | TEMPERATURE: 97.4 F | DIASTOLIC BLOOD PRESSURE: 88 MMHG | SYSTOLIC BLOOD PRESSURE: 136 MMHG | RESPIRATION RATE: 20 BRPM

## 2022-09-13 DIAGNOSIS — R41.840 DIFFICULTY CONCENTRATING: ICD-10-CM

## 2022-09-13 DIAGNOSIS — F41.9 ANXIETY: ICD-10-CM

## 2022-09-13 DIAGNOSIS — E66.01 CLASS 3 SEVERE OBESITY WITH SERIOUS COMORBIDITY AND BODY MASS INDEX (BMI) OF 40.0 TO 44.9 IN ADULT, UNSPECIFIED OBESITY TYPE: ICD-10-CM

## 2022-09-13 DIAGNOSIS — E78.2 MIXED HYPERLIPIDEMIA: Primary | Chronic | ICD-10-CM

## 2022-09-13 PROBLEM — E66.813 CLASS 3 SEVERE OBESITY WITH SERIOUS COMORBIDITY AND BODY MASS INDEX (BMI) OF 40.0 TO 44.9 IN ADULT: Status: ACTIVE | Noted: 2022-01-20

## 2022-09-13 PROCEDURE — 99214 OFFICE O/P EST MOD 30 MIN: CPT | Performed by: NURSE PRACTITIONER

## 2022-09-13 RX ORDER — FENOFIBRATE 160 MG/1
160 TABLET ORAL NIGHTLY
Qty: 90 TABLET | Refills: 1 | Status: SHIPPED | OUTPATIENT
Start: 2022-09-13 | End: 2023-03-09

## 2022-09-13 RX ORDER — BUPROPION HYDROCHLORIDE 150 MG/1
150 TABLET ORAL EVERY MORNING
Qty: 30 TABLET | Refills: 2 | Status: SHIPPED | OUTPATIENT
Start: 2022-09-13 | End: 2022-10-13 | Stop reason: SDUPTHER

## 2022-09-13 RX ORDER — ESTRADIOL 1 MG/1
1 TABLET ORAL DAILY
Qty: 90 TABLET | Refills: 1 | Status: CANCELLED | OUTPATIENT
Start: 2022-09-13

## 2022-09-13 RX ORDER — SIMVASTATIN 40 MG
40 TABLET ORAL NIGHTLY
Qty: 90 TABLET | Refills: 1 | Status: SHIPPED | OUTPATIENT
Start: 2022-09-13 | End: 2023-03-09

## 2022-09-13 RX ORDER — CITALOPRAM 40 MG/1
40 TABLET ORAL DAILY
Qty: 90 TABLET | Refills: 1 | Status: SHIPPED | OUTPATIENT
Start: 2022-09-13 | End: 2023-03-09

## 2022-09-13 NOTE — ASSESSMENT & PLAN NOTE
Having difficulty concentrating, will start her on Wellbutrin  mg once daily in the morning.  She will follow-up in 1 month.

## 2022-09-13 NOTE — ASSESSMENT & PLAN NOTE
She has been off of Celexa for about 2 months, will restart Celexa but advised her to start at half a tablet for 1 to 2 weeks and then increase to full tablet.  She will follow-up in 4 weeks.

## 2022-09-13 NOTE — ASSESSMENT & PLAN NOTE
Patient's (Body mass index is 42.96 kg/m².) indicates that they are morbidly obese (BMI > 40 or > 35 with obesity - related health condition) with health conditions that include dyslipidemias . Weight is newly identified. BMI is is above average; BMI management plan is completed. We discussed portion control.

## 2022-09-13 NOTE — ASSESSMENT & PLAN NOTE
Lipid abnormalities are newly identified.  Pharmacotherapy as ordered.  She will return for fasting labs in 1 to 2 weeks.  Lipids will be reassessed in 6 months.

## 2022-09-20 ENCOUNTER — LAB (OUTPATIENT)
Dept: FAMILY MEDICINE CLINIC | Facility: CLINIC | Age: 42
End: 2022-09-20

## 2022-09-20 DIAGNOSIS — E78.2 MIXED HYPERLIPIDEMIA: Chronic | ICD-10-CM

## 2022-09-20 LAB
ALBUMIN SERPL-MCNC: 4.3 G/DL (ref 3.5–5.2)
ALBUMIN/GLOB SERPL: 1.7 G/DL
ALP SERPL-CCNC: 45 U/L (ref 39–117)
ALT SERPL W P-5'-P-CCNC: 15 U/L (ref 1–33)
ANION GAP SERPL CALCULATED.3IONS-SCNC: 9.4 MMOL/L (ref 5–15)
AST SERPL-CCNC: 22 U/L (ref 1–32)
BILIRUB SERPL-MCNC: 0.3 MG/DL (ref 0–1.2)
BUN SERPL-MCNC: 20 MG/DL (ref 6–20)
BUN/CREAT SERPL: 16.5 (ref 7–25)
CALCIUM SPEC-SCNC: 9.3 MG/DL (ref 8.6–10.5)
CHLORIDE SERPL-SCNC: 104 MMOL/L (ref 98–107)
CHOLEST SERPL-MCNC: 174 MG/DL (ref 0–200)
CO2 SERPL-SCNC: 23.6 MMOL/L (ref 22–29)
CREAT SERPL-MCNC: 1.21 MG/DL (ref 0.57–1)
EGFRCR SERPLBLD CKD-EPI 2021: 57.1 ML/MIN/1.73
GLOBULIN UR ELPH-MCNC: 2.5 GM/DL
GLUCOSE SERPL-MCNC: 85 MG/DL (ref 65–99)
HDLC SERPL-MCNC: 36 MG/DL (ref 40–60)
LDLC SERPL CALC-MCNC: 106 MG/DL (ref 0–100)
LDLC/HDLC SERPL: 2.83 {RATIO}
POTASSIUM SERPL-SCNC: 4.6 MMOL/L (ref 3.5–5.2)
PROT SERPL-MCNC: 6.8 G/DL (ref 6–8.5)
SODIUM SERPL-SCNC: 137 MMOL/L (ref 136–145)
TRIGL SERPL-MCNC: 181 MG/DL (ref 0–150)
VLDLC SERPL-MCNC: 32 MG/DL (ref 5–40)

## 2022-09-20 PROCEDURE — 80061 LIPID PANEL: CPT | Performed by: NURSE PRACTITIONER

## 2022-09-20 PROCEDURE — 80053 COMPREHEN METABOLIC PANEL: CPT | Performed by: NURSE PRACTITIONER

## 2022-10-13 ENCOUNTER — OFFICE VISIT (OUTPATIENT)
Dept: FAMILY MEDICINE CLINIC | Facility: CLINIC | Age: 42
End: 2022-10-13

## 2022-10-13 VITALS
DIASTOLIC BLOOD PRESSURE: 91 MMHG | BODY MASS INDEX: 42.59 KG/M2 | HEART RATE: 76 BPM | TEMPERATURE: 97 F | HEIGHT: 63 IN | SYSTOLIC BLOOD PRESSURE: 132 MMHG | OXYGEN SATURATION: 97 % | WEIGHT: 240.4 LBS

## 2022-10-13 DIAGNOSIS — Z72.0 TOBACCO USE: ICD-10-CM

## 2022-10-13 DIAGNOSIS — E66.01 CLASS 3 SEVERE OBESITY WITH SERIOUS COMORBIDITY AND BODY MASS INDEX (BMI) OF 40.0 TO 44.9 IN ADULT, UNSPECIFIED OBESITY TYPE: ICD-10-CM

## 2022-10-13 DIAGNOSIS — E78.2 MIXED HYPERLIPIDEMIA: Chronic | ICD-10-CM

## 2022-10-13 DIAGNOSIS — F41.1 GENERALIZED ANXIETY DISORDER: ICD-10-CM

## 2022-10-13 DIAGNOSIS — R41.840 DIFFICULTY CONCENTRATING: Primary | ICD-10-CM

## 2022-10-13 PROBLEM — Z01.419 WELL WOMAN EXAM: Status: RESOLVED | Noted: 2022-01-19 | Resolved: 2022-10-13

## 2022-10-13 PROCEDURE — 99214 OFFICE O/P EST MOD 30 MIN: CPT | Performed by: NURSE PRACTITIONER

## 2022-10-13 RX ORDER — BUPROPION HYDROCHLORIDE 300 MG/1
300 TABLET ORAL EVERY MORNING
Qty: 30 TABLET | Refills: 5 | Status: SHIPPED | OUTPATIENT
Start: 2022-10-13 | End: 2023-01-16 | Stop reason: SDUPTHER

## 2022-10-13 NOTE — PROGRESS NOTES
"Chief Complaint  Altered Mental Status (Difficulty concentrating one month follow up )    Subjective          Natalya Bynum, 42 y.o. female presents to [facility]  History of Present Illness   She presents today for 1 month follow-up on anxiety and difficulty concentrating.  She was restarted on Celexa on her last visit.  She was also started on Wellbutrin  mg daily.  She states that Wellbutrin is helping with her difficulty concentrating but she states it seems to wear off by the afternoon.    Obesity: We had discussed weight loss management, recommend portion control. She lost 2 pounds since her last visit.  She states she is going to get a treadmill to try to increase exercise.      She does have mixed hyperlipidemia.  She is on fenofibrate and simvastatin.  Her lipid panel was stable.    Natalya Bynum  reports that she has been smoking cigarettes. She started smoking about 11 years ago. She has a 2.50 pack-year smoking history. She has never used smokeless tobacco.. I have educated her on the risk of diseases from using tobacco products such as cancer, COPD, heart disease and high risk for blood clots with HRT.     I advised her to quit and she is not willing to quit.    I spent 6 minutes counseling the patient.    Objective   Vital Signs:   /91 (BP Location: Left arm, Patient Position: Sitting, Cuff Size: Adult)   Pulse 76   Temp 97 °F (36.1 °C)   Ht 160 cm (63\")   Wt 109 kg (240 lb 6.4 oz)   SpO2 97%   BMI 42.58 kg/m²     Current Outpatient Medications on File Prior to Visit   Medication Sig Dispense Refill   • citalopram (CeleXA) 40 MG tablet Take 1 tablet by mouth Daily. 90 tablet 1   • estradiol (ESTRACE) 1 MG tablet Take 1 tablet by mouth Daily. 90 tablet 3   • fenofibrate 160 MG tablet Take 1 tablet by mouth Every Night. 90 tablet 1   • simvastatin (ZOCOR) 40 MG tablet Take 1 tablet by mouth Every Night. 90 tablet 1   • [DISCONTINUED] buPROPion " XL (Wellbutrin XL) 150 MG 24 hr tablet Take 1 tablet by mouth Every Morning. 30 tablet 2     No current facility-administered medications on file prior to visit.     Past Medical History:   Diagnosis Date   • Abnormal Pap smear of cervix    • Cervical dysplasia    • Hyperlipidemia    • Migraine       Physical Exam  Vitals reviewed.   Constitutional:       Appearance: Normal appearance. She is well-developed. She is morbidly obese.   Neck:      Thyroid: No thyroid mass, thyromegaly or thyroid tenderness.   Cardiovascular:      Rate and Rhythm: Normal rate and regular rhythm.      Heart sounds: No murmur heard.    No friction rub. No gallop.   Pulmonary:      Effort: Pulmonary effort is normal.      Breath sounds: Normal breath sounds. No wheezing or rhonchi.   Lymphadenopathy:      Cervical: No cervical adenopathy.   Skin:     General: Skin is warm and dry.   Neurological:      Mental Status: She is alert and oriented to person, place, and time.      Cranial Nerves: No cranial nerve deficit.   Psychiatric:         Mood and Affect: Mood and affect normal.         Behavior: Behavior normal.         Thought Content: Thought content normal. Thought content does not include homicidal or suicidal ideation.         Judgment: Judgment normal.        Result Review :     CMP    CMP 11/11/21 9/20/22   Glucose 76 85   BUN 10 20   Creatinine 1.03 (A) 1.21 (A)   eGFR Non African Am 59 (A)    Sodium 140 137   Potassium 4.3 4.6   Chloride 105 104   Calcium 9.3 9.3   Albumin 4.10 4.30   Total Bilirubin 0.3 0.3   Alkaline Phosphatase 64 45   AST (SGOT) 21 22   ALT (SGPT) 21 15   (A) Abnormal value       Comments are available for some flowsheets but are not being displayed.           Lipid Panel    Lipid Panel 11/11/21 9/20/22   Total Cholesterol 197 174   Triglycerides 194 (A) 181 (A)   HDL Cholesterol 31 (A) 36 (A)   VLDL Cholesterol 35 32   LDL Cholesterol  131 (A) 106 (A)   LDL/HDL Ratio 4.10 2.83   (A) Abnormal value                       Assessment and Plan    Diagnoses and all orders for this visit:    1. Difficulty concentrating (Primary)  Assessment & Plan:  She is improving with Wellbutrin but she is struggling in the afternoon.  We will increase Wellbutrin XL dose to 300 mg every morning.  Advised her to call if she has any side effects to the increased dose.  She will follow-up in 3 months or sooner if needed.    Orders:  -     buPROPion XL (Wellbutrin XL) 300 MG 24 hr tablet; Take 1 tablet by mouth Every Morning.  Dispense: 30 tablet; Refill: 5    2. Generalized anxiety disorder  Assessment & Plan:  Psychological condition is improving with treatment.  Continue current treatment regimen.  Psychological condition  will be reassessed in 3 months.      3. Class 3 severe obesity with serious comorbidity and body mass index (BMI) of 40.0 to 44.9 in adult, unspecified obesity type (HCC)  Assessment & Plan:  Patient's (Body mass index is 42.58 kg/m².) indicates that they are morbidly obese (BMI > 40 or > 35 with obesity - related health condition) with health conditions that include dyslipidemias . Weight is unchanged. BMI is is above average; BMI management plan is completed. We discussed low calorie, low carb based diet program, portion control and increasing exercise.       4. Tobacco use  Assessment & Plan:  I discussed with her the dangers of smoking with hormone replacement therapy and I recommended that she quit smoking.  She states she only smokes about 1 pack/week but she is not ready to completely quit at this time.      5. Mixed hyperlipidemia  Assessment & Plan:  Lipid abnormalities are improving with treatment.  Pharmacotherapy as ordered.  Lipids will be reassessed in 6 months.        Follow Up   Return in about 3 months (around 1/13/2023) for Next scheduled follow up.  Patient was given instructions and counseling regarding her condition or for health maintenance advice. Please see specific information pulled into the AVS if  appropriate.       Macarena Carter, APRN  10/13/2022

## 2022-10-13 NOTE — ASSESSMENT & PLAN NOTE
I discussed with her the dangers of smoking with hormone replacement therapy and I recommended that she quit smoking.  She states she only smokes about 1 pack/week but she is not ready to completely quit at this time.

## 2022-10-13 NOTE — ASSESSMENT & PLAN NOTE
Patient's (Body mass index is 42.58 kg/m².) indicates that they are morbidly obese (BMI > 40 or > 35 with obesity - related health condition) with health conditions that include dyslipidemias . Weight is unchanged. BMI is is above average; BMI management plan is completed. We discussed low calorie, low carb based diet program, portion control and increasing exercise.

## 2022-10-26 ENCOUNTER — OFFICE VISIT (OUTPATIENT)
Dept: OBSTETRICS AND GYNECOLOGY | Facility: CLINIC | Age: 42
End: 2022-10-26

## 2022-10-26 VITALS
BODY MASS INDEX: 42.59 KG/M2 | HEART RATE: 87 BPM | DIASTOLIC BLOOD PRESSURE: 98 MMHG | WEIGHT: 240.4 LBS | HEIGHT: 63 IN | SYSTOLIC BLOOD PRESSURE: 160 MMHG

## 2022-10-26 DIAGNOSIS — R87.622 LGSIL PAP SMEAR OF VAGINA: Primary | ICD-10-CM

## 2022-10-26 DIAGNOSIS — Z72.0 TOBACCO USE: ICD-10-CM

## 2022-10-26 DIAGNOSIS — N95.1 MENOPAUSAL VASOMOTOR SYNDROME: ICD-10-CM

## 2022-10-26 PROCEDURE — 57452 EXAM OF CERVIX W/SCOPE: CPT | Performed by: OBSTETRICS & GYNECOLOGY

## 2022-10-26 PROCEDURE — 99213 OFFICE O/P EST LOW 20 MIN: CPT | Performed by: OBSTETRICS & GYNECOLOGY

## 2022-10-26 RX ORDER — ESTRADIOL 2 MG/1
2 TABLET ORAL DAILY
Qty: 90 TABLET | Refills: 3 | Status: SHIPPED | OUTPATIENT
Start: 2022-10-26 | End: 2023-01-23 | Stop reason: SDUPTHER

## 2022-10-27 PROBLEM — N95.1 MENOPAUSAL VASOMOTOR SYNDROME: Status: ACTIVE | Noted: 2022-10-27

## 2022-10-27 PROBLEM — R87.622 LGSIL PAP SMEAR OF VAGINA: Status: ACTIVE | Noted: 2022-10-27

## 2022-10-27 NOTE — ASSESSMENT & PLAN NOTE
Status post hysterectomy.  Vaginal colposcopy carried out.  No abnormalities noted on today's exam.  No biopsy taken.  Recommend repeat Pap in 1 year.  If abnormal once again then would recommend repeat colposcopy and random biopsies if the colposcopy appears normal.

## 2022-10-27 NOTE — PROGRESS NOTES
"  Colposcopy    Tobacco/Nicotine use:  Yes  Pap result: LGSIL  Consent signed: Yes    CC: Presents for colposcopy     Procedure reviewed in detail.  She understands the potential risks include, but are not limited to, pain, bleeding, and infection.  Her questions have been answered.        Subjective:  Here for colposcopy.  No concerns or problems.    Objective:  /98   Pulse 87   Ht 160 cm (63\")   Wt 109 kg (240 lb 6.4 oz)   BMI 42.58 kg/m²     Physical Exam  Vitals and nursing note reviewed. Exam conducted with a chaperone present.   Constitutional:       General: She is not in acute distress.     Appearance: Normal appearance. She is obese. She is not ill-appearing.   Neck:      Thyroid: No thyroid mass or thyromegaly.   Cardiovascular:      Rate and Rhythm: Regular rhythm.      Heart sounds: No murmur heard.  Pulmonary:      Effort: Pulmonary effort is normal.      Breath sounds: No wheezing.   Chest:   Breasts:     Right: No mass, nipple discharge or tenderness.      Left: No mass, nipple discharge or tenderness.   Abdominal:      General: There is no distension.      Palpations: Abdomen is soft. There is no mass.      Tenderness: There is no abdominal tenderness.   Genitourinary:     General: Normal vulva.      Exam position: Lithotomy position.      Labia:         Right: No rash, tenderness, lesion or injury.         Left: No rash, tenderness, lesion or injury.       Urethra: No prolapse, urethral pain or urethral lesion.      Vagina: Normal. No vaginal discharge, tenderness or prolapsed vaginal walls.      Cervix: Normal.      Uterus: Absent.       Adnexa: Right adnexa normal and left adnexa normal.      Rectum: Normal.            Comments: The uterus and cervix are surgically absent.  Vaginal colposcopy was carried out.  No acetowhite epithelium was noted.  No biopsy was taken.  Musculoskeletal:      Right lower leg: No edema.      Left lower leg: No edema.   Skin:     General: Skin is warm and " dry.      Findings: No rash.   Neurological:      Mental Status: She is alert and oriented to person, place, and time.   Psychiatric:         Mood and Affect: Mood normal.         Behavior: Behavior normal.         Thought Content: Thought content normal.         Judgment: Judgment normal.          Assessment and Plan:  Diagnoses and all orders for this visit:    1. LGSIL Pap smear of vagina (Primary)  Assessment & Plan:  Status post hysterectomy.  Vaginal colposcopy carried out.  No abnormalities noted on today's exam.  No biopsy taken.  Recommend repeat Pap in 1 year.  If abnormal once again then would recommend repeat colposcopy and random biopsies if the colposcopy appears normal.      2. Menopausal vasomotor syndrome  -     estradiol (Estrace) 2 MG tablet; Take 1 tablet by mouth Daily.  Dispense: 90 tablet; Refill: 3    3. Tobacco use  Assessment & Plan:  Recommend smoking cessation          Counseling:    We discussed HPV in detail.  Incidence, transmission, course, remission, progression, types, cervical cancer, genital warts, monitoring, and importance of compliance were reviewed.  A HPV handout was provided if she desired, HPV vaccine was discussed and recommended if appropriate.  Written information was made available.  The vaccine protects against the 2 types of HPV that cause 70% of cervical cancer and 90% of genital warts.  Condoms were recommended, I recommend she quit smoking.  Options reviewed.  I recommend she FU w PCP to assist if unable to do on her own    She understands the need for continued follow up until she is cleared to return to routine screening pap smears.  She understands the importance of follow up and consequences with lack of follow up (i.e. potential for cervical cancer).  Follow up usually ranges every 6months - 12months.      PRECAUTIONS - It is common to have bright red spotting and dark discharge after today.  If she has had cervical biopsies, she is to avoid intercourse or  tampons as directed for 7 days.  She can use OTC pain relievers prn.  She needs to return to office or ER (if after hours/weekends) if she has pelvic pain, bleeding > 1 pad/2 hours, discharge that has a bad vaginal odor or vaginal itching, fever > 101.5, or any other concerns.        Follow Up:  Return for Annual physical.      Reji Nuñez MD  10/26/2022

## 2023-01-16 ENCOUNTER — OFFICE VISIT (OUTPATIENT)
Dept: FAMILY MEDICINE CLINIC | Facility: CLINIC | Age: 43
End: 2023-01-16
Payer: COMMERCIAL

## 2023-01-16 VITALS
HEIGHT: 63 IN | SYSTOLIC BLOOD PRESSURE: 148 MMHG | TEMPERATURE: 97.5 F | HEART RATE: 75 BPM | DIASTOLIC BLOOD PRESSURE: 94 MMHG | BODY MASS INDEX: 43.78 KG/M2 | OXYGEN SATURATION: 99 % | WEIGHT: 247.1 LBS

## 2023-01-16 DIAGNOSIS — E66.01 CLASS 3 SEVERE OBESITY WITH SERIOUS COMORBIDITY AND BODY MASS INDEX (BMI) OF 40.0 TO 44.9 IN ADULT, UNSPECIFIED OBESITY TYPE: ICD-10-CM

## 2023-01-16 DIAGNOSIS — R41.840 DIFFICULTY CONCENTRATING: Primary | ICD-10-CM

## 2023-01-16 DIAGNOSIS — F41.9 ANXIETY: ICD-10-CM

## 2023-01-16 DIAGNOSIS — I10 PRIMARY HYPERTENSION: ICD-10-CM

## 2023-01-16 PROCEDURE — 99214 OFFICE O/P EST MOD 30 MIN: CPT | Performed by: NURSE PRACTITIONER

## 2023-01-16 RX ORDER — CITALOPRAM 40 MG/1
40 TABLET ORAL DAILY
Qty: 90 TABLET | Refills: 1 | Status: CANCELLED | OUTPATIENT
Start: 2023-01-16

## 2023-01-16 RX ORDER — SIMVASTATIN 40 MG
40 TABLET ORAL NIGHTLY
Qty: 90 TABLET | Refills: 1 | Status: CANCELLED | OUTPATIENT
Start: 2023-01-16

## 2023-01-16 RX ORDER — BUPROPION HYDROCHLORIDE 300 MG/1
300 TABLET ORAL EVERY MORNING
Qty: 90 TABLET | Refills: 1 | Status: SHIPPED | OUTPATIENT
Start: 2023-01-16

## 2023-01-16 RX ORDER — FENOFIBRATE 160 MG/1
160 TABLET ORAL NIGHTLY
Qty: 90 TABLET | Refills: 1 | Status: CANCELLED | OUTPATIENT
Start: 2023-01-16

## 2023-01-16 NOTE — ASSESSMENT & PLAN NOTE
Patient's (Body mass index is 43.77 kg/m².) indicates that they are morbidly/severely obese (BMI > 40 or > 35 with obesity - related health condition) with health conditions that include hypertension and dyslipidemias . Weight is unchanged. BMI is is above average; BMI management plan is completed. We discussed portion control and increasing exercise.

## 2023-01-16 NOTE — ASSESSMENT & PLAN NOTE
Hypertension is newly identified.  Dietary sodium restriction.  Weight loss.  Regular aerobic exercise.  Stop smoking.  Ambulatory blood pressure monitoring.  Blood pressure will be reassessed in 3 months.

## 2023-01-16 NOTE — ASSESSMENT & PLAN NOTE
Anxiety is improving on Celexa 40 mg daily, patient will continue current dose.  She will follow-up with me in 3 months.

## 2023-01-16 NOTE — PROGRESS NOTES
"Chief Complaint  Anxiety    Subjective          Natalya MANE Bynum, 43 y.o. female presents to [facility]  History of Present Illness   She presents today for 3 month follow-up on anxiety and difficulty concentrating.  She is doing well on Celexa. We increased Wellbutrin XL dose to 300 mg daily on her last visit. She states she is doing much better and has quit smoking mostly, states only smokes 1 cigarette/day.    Obesity: We had discussed weight loss management, she states she has joined a gym and is exercising routinely.  Her weight appears to have increased but she states she has on heavier clothes today with boots.  She states she weighed herself at home and only weighed 239 this morning.  She states she has been going to the gym and working out.      She does have mixed hyperlipidemia.  She is on fenofibrate and simvastatin.  Her last lipid panel on 9/20/2022 was stable, improving.     Her blood pressure is elevated in the office today.  I rechecked it and it was still elevated at 154/90.  She states she does have family history of hypertension.  She does have previous blood pressures that are elevated as well.  We discussed diet and she states she could decrease her sodium intake.      Tobacco Use: High Risk   • Smoking Tobacco Use: Some Days   • Smokeless Tobacco Use: Never   • Passive Exposure: Not on file      Objective   Vital Signs:   /94   Pulse 75   Temp 97.5 °F (36.4 °C)   Ht 160 cm (63\")   Wt 112 kg (247 lb 1.6 oz)   SpO2 99%   BMI 43.77 kg/m²       Current Outpatient Medications:   •  buPROPion XL (Wellbutrin XL) 300 MG 24 hr tablet, Take 1 tablet by mouth Every Morning., Disp: 90 tablet, Rfl: 1  •  citalopram (CeleXA) 40 MG tablet, Take 1 tablet by mouth Daily., Disp: 90 tablet, Rfl: 1  •  estradiol (Estrace) 2 MG tablet, Take 1 tablet by mouth Daily., Disp: 90 tablet, Rfl: 3  •  fenofibrate 160 MG tablet, Take 1 tablet by mouth Every Night., Disp: 90 " tablet, Rfl: 1  •  simvastatin (ZOCOR) 40 MG tablet, Take 1 tablet by mouth Every Night., Disp: 90 tablet, Rfl: 1   Past Medical History:   Diagnosis Date   • Abnormal Pap smear of cervix    • Cervical dysplasia    • H. pylori infection    • Hyperlipidemia    • Migraine       Physical Exam  Vitals reviewed.   Constitutional:       Appearance: Normal appearance. She is well-developed. She is obese.   Neck:      Thyroid: No thyroid mass, thyromegaly or thyroid tenderness.   Cardiovascular:      Rate and Rhythm: Normal rate and regular rhythm.      Heart sounds: No murmur heard.    No friction rub. No gallop.   Pulmonary:      Effort: Pulmonary effort is normal.      Breath sounds: Normal breath sounds. No wheezing or rhonchi.   Lymphadenopathy:      Cervical: No cervical adenopathy.   Skin:     General: Skin is warm and dry.   Neurological:      Mental Status: She is alert and oriented to person, place, and time.      Cranial Nerves: No cranial nerve deficit.   Psychiatric:         Mood and Affect: Mood and affect normal.         Behavior: Behavior normal.         Thought Content: Thought content normal. Thought content does not include homicidal or suicidal ideation.         Judgment: Judgment normal.        Result Review :     Common labs    Common Labs 9/20/22 9/20/22    [id number]   Glucose  85   BUN  20   Creatinine  1.21 (A)   Sodium  137   Potassium  4.6   Chloride  104   Calcium  9.3   Albumin  4.30   Total Bilirubin  0.3   Alkaline Phosphatase  45   AST (SGOT)  22   ALT (SGPT)  15   Total Cholesterol 174    Triglycerides 181 (A)    HDL Cholesterol 36 (A)    LDL Cholesterol  106 (A)    (A) Abnormal value       Comments are available for some flowsheets but are not being displayed.                     Assessment and Plan    Diagnoses and all orders for this visit:    1. Difficulty concentrating (Primary)  Assessment & Plan:  She is doing much better on the increased dose of Wellbutrin 300 mg daily, will continue  current dose    Orders:  -     buPROPion XL (Wellbutrin XL) 300 MG 24 hr tablet; Take 1 tablet by mouth Every Morning.  Dispense: 90 tablet; Refill: 1    2. Primary hypertension  Assessment & Plan:  Hypertension is newly identified.  Dietary sodium restriction.  Weight loss.  Regular aerobic exercise.  Stop smoking.  Ambulatory blood pressure monitoring.  Blood pressure will be reassessed in 3 months.      3. Anxiety  Assessment & Plan:  Anxiety is improving on Celexa 40 mg daily, patient will continue current dose.  She will follow-up with me in 3 months.      4. Class 3 severe obesity with serious comorbidity and body mass index (BMI) of 40.0 to 44.9 in adult, unspecified obesity type (HCC)  Assessment & Plan:  Patient's (Body mass index is 43.77 kg/m².) indicates that they are morbidly/severely obese (BMI > 40 or > 35 with obesity - related health condition) with health conditions that include hypertension and dyslipidemias . Weight is unchanged. BMI is is above average; BMI management plan is completed. We discussed portion control and increasing exercise.         Follow Up   Return in about 3 months (around 4/16/2023) for Next scheduled follow up HTN.  Patient was given instructions and counseling regarding her condition or for health maintenance advice. Please see specific information pulled into the AVS if appropriate.       Macarena Carter, SERA  01/16/2023

## 2023-01-16 NOTE — ASSESSMENT & PLAN NOTE
She is doing much better on the increased dose of Wellbutrin 300 mg daily, will continue current dose

## 2023-01-23 ENCOUNTER — OFFICE VISIT (OUTPATIENT)
Dept: OBSTETRICS AND GYNECOLOGY | Facility: CLINIC | Age: 43
End: 2023-01-23
Payer: COMMERCIAL

## 2023-01-23 VITALS
BODY MASS INDEX: 43.41 KG/M2 | HEIGHT: 63 IN | SYSTOLIC BLOOD PRESSURE: 146 MMHG | WEIGHT: 245 LBS | DIASTOLIC BLOOD PRESSURE: 92 MMHG | HEART RATE: 87 BPM

## 2023-01-23 DIAGNOSIS — N95.1 MENOPAUSAL VASOMOTOR SYNDROME: ICD-10-CM

## 2023-01-23 DIAGNOSIS — Z01.419 WOMEN'S ANNUAL ROUTINE GYNECOLOGICAL EXAMINATION: Primary | ICD-10-CM

## 2023-01-23 DIAGNOSIS — E66.01 CLASS 3 SEVERE OBESITY WITH SERIOUS COMORBIDITY AND BODY MASS INDEX (BMI) OF 40.0 TO 44.9 IN ADULT, UNSPECIFIED OBESITY TYPE: ICD-10-CM

## 2023-01-23 DIAGNOSIS — Z72.0 TOBACCO USE: ICD-10-CM

## 2023-01-23 PROCEDURE — G0123 SCREEN CERV/VAG THIN LAYER: HCPCS | Performed by: OBSTETRICS & GYNECOLOGY

## 2023-01-23 PROCEDURE — 99396 PREV VISIT EST AGE 40-64: CPT | Performed by: OBSTETRICS & GYNECOLOGY

## 2023-01-23 RX ORDER — ESTRADIOL 2 MG/1
2 TABLET ORAL DAILY
Qty: 90 TABLET | Refills: 3 | Status: SHIPPED | OUTPATIENT
Start: 2023-01-23

## 2023-01-23 NOTE — PROGRESS NOTES
"Well Woman Visit    CC: PHYLICIA     HPI:   43 y.o. who presents for a well woman exam. No concerns. History of TLH with BSO.  Doing well with her hormone replacement therapy.  She was to continue hormone replacement therapy.  No concerns.    History: PMHx, Meds, Allergies, PSHx, Social Hx, and POBHx all reviewed and updated.    /92   Pulse 87   Ht 160 cm (63\")   Wt 111 kg (245 lb)   Breastfeeding No   BMI 43.40 kg/m²     Physical Exam  Vitals and nursing note reviewed. Exam conducted with a chaperone present.   Constitutional:       General: She is not in acute distress.     Appearance: Normal appearance. She is obese. She is not ill-appearing.   HENT:      Head: Normocephalic and atraumatic.   Eyes:      Extraocular Movements: Extraocular movements intact.   Neck:      Thyroid: No thyroid mass or thyromegaly.   Chest:   Breasts:     Breasts are symmetrical.      Right: Normal. No swelling, bleeding, inverted nipple, mass, nipple discharge, skin change or tenderness.      Left: Normal. No swelling, bleeding, inverted nipple, mass, nipple discharge, skin change or tenderness.   Abdominal:      General: Abdomen is flat. There is no distension.      Palpations: Abdomen is soft. There is no mass.      Tenderness: There is no abdominal tenderness. There is no guarding or rebound.      Hernia: No hernia is present. There is no hernia in the left inguinal area or right inguinal area.   Genitourinary:     General: Normal vulva.      Exam position: Lithotomy position.      Pubic Area: No rash.       Labia:         Right: No rash, tenderness, lesion or injury.         Left: No rash, tenderness, lesion or injury.       Urethra: No prolapse, urethral pain, urethral swelling or urethral lesion.      Vagina: Normal. No vaginal discharge, tenderness or prolapsed vaginal walls.      Uterus: Absent.       Comments: The uterus, cervix, and bilateral ovaries are surgically absent  Lymphadenopathy:      Upper Body:      " Right upper body: No supraclavicular, axillary or pectoral adenopathy.      Left upper body: No supraclavicular, axillary or pectoral adenopathy.   Skin:     General: Skin is warm and dry.   Neurological:      Mental Status: She is alert and oriented to person, place, and time.   Psychiatric:         Mood and Affect: Mood normal.         Behavior: Behavior normal.         Thought Content: Thought content normal.         ASSESSMENT AND PLAN:    Diagnoses and all orders for this visit:    1. Women's annual routine gynecological examination (Primary)  Assessment & Plan:  Pap  Mammogram    Orders:  -     IGP,rfx Aptima HPV All Pth  -     Mammo Screening Digital Tomosynthesis Bilateral With CAD; Future    2. Menopausal vasomotor syndrome  Assessment & Plan:  The patient symptoms are controlled with estradiol 2 mg daily.  She wishes to remain on hormone replacement therapy.  The risks, benefits, alternatives have been discussed including the risk of VTE.    Orders:  -     estradiol (Estrace) 2 MG tablet; Take 1 tablet by mouth Daily.  Dispense: 90 tablet; Refill: 3    3. Class 3 severe obesity with serious comorbidity and body mass index (BMI) of 40.0 to 44.9 in adult, unspecified obesity type (HCC)  Assessment & Plan:  Discussed exercise, nutrition and recommended weight loss.      4. Tobacco use  Assessment & Plan:  Smoking cessation        Counseling:     HRT R/B/A/SE/E all options including non-FDA approved options reviewed    Preventative:   MMG  Recommend FLU vaccine this season, R/B discussed  Recommend COVID vaccine, R/B discussed    She understands the importance of having any ordered tests to be performed in a timely fashion.  The risks of not performing them include, but are not limited to, advanced cancer stages, bone loss from osteoporosis and/or subsequent increase in morbidity and/or mortality.  She is encouraged to review her results online and/or contact or office if she has questions.     Follow  Up:  Return for Annual physical.    Reji Nuñez MD  01/23/2023

## 2023-01-23 NOTE — ASSESSMENT & PLAN NOTE
The patient symptoms are controlled with estradiol 2 mg daily.  She wishes to remain on hormone replacement therapy.  The risks, benefits, alternatives have been discussed including the risk of VTE.

## 2023-01-25 LAB
CONV .: NORMAL
CYTOLOGIST CVX/VAG CYTO: NORMAL
CYTOLOGY CVX/VAG DOC CYTO: NORMAL
CYTOLOGY CVX/VAG DOC THIN PREP: NORMAL
DX ICD CODE: NORMAL
HIV 1 & 2 AB SER-IMP: NORMAL
OTHER STN SPEC: NORMAL
STAT OF ADQ CVX/VAG CYTO-IMP: NORMAL

## 2023-02-18 NOTE — PROGRESS NOTES
"Chief Complaint  Earache    Subjective          Natalya MANE Bynum, 43 y.o. female presents to [facility]  History of Present Illness     Patient presents today with complaints of left ear pain and pressure.  She had went to an urgent care on February 9, was treated with amoxicillin but did not get any relief.  She returned to the urgent care and then was changed to Keflex.  She denies any drainage from her ear now.  She has been taking over-the-counter ibuprofen 600 mg with some relief.  She is pointing to the preauricular area as where her pain is.  She denies knowing if she grinds her teeth at night.     Tobacco Use: Medium Risk   • Smoking Tobacco Use: Former   • Smokeless Tobacco Use: Never   • Passive Exposure: Not on file      Objective   Vital Signs:   /87   Pulse 79   Temp 97.6 °F (36.4 °C)   Ht 160 cm (63\")   Wt 113 kg (248 lb 3.2 oz)   SpO2 98%   BMI 43.97 kg/m²       Current Outpatient Medications:   •  buPROPion XL (Wellbutrin XL) 300 MG 24 hr tablet, Take 1 tablet by mouth Every Morning., Disp: 90 tablet, Rfl: 1  •  citalopram (CeleXA) 40 MG tablet, Take 1 tablet by mouth Daily., Disp: 90 tablet, Rfl: 1  •  estradiol (Estrace) 2 MG tablet, Take 1 tablet by mouth Daily., Disp: 90 tablet, Rfl: 3  •  fenofibrate 160 MG tablet, Take 1 tablet by mouth Every Night., Disp: 90 tablet, Rfl: 1  •  simvastatin (ZOCOR) 40 MG tablet, Take 1 tablet by mouth Every Night., Disp: 90 tablet, Rfl: 1  •  cyclobenzaprine (FLEXERIL) 10 MG tablet, Take 1 tablet by mouth 3 (Three) Times a Day As Needed (jaw pain)., Disp: 45 tablet, Rfl: 0  •  ibuprofen (ADVIL,MOTRIN) 800 MG tablet, Take 1 tablet by mouth Every 8 (Eight) Hours As Needed for Moderate Pain., Disp: 45 tablet, Rfl: 0   Past Medical History:   Diagnosis Date   • Abnormal Pap smear of cervix    • Cervical dysplasia    • H. pylori infection    • HPV (human papilloma virus) infection    • Hyperlipidemia    • Migraine     "   Physical Exam  Vitals reviewed.   Constitutional:       Appearance: Normal appearance. She is well-developed.   HENT:      Head:      Jaw: Tenderness present. No swelling or malocclusion.      Comments: Left preauricular tenderness.     Right Ear: Tympanic membrane, ear canal and external ear normal. No drainage. Tympanic membrane is not injected, perforated, erythematous or bulging.      Left Ear: Tympanic membrane, ear canal and external ear normal. No drainage. Tympanic membrane is not injected, perforated, erythematous or bulging.      Mouth/Throat:      Pharynx: No pharyngeal swelling, oropharyngeal exudate or posterior oropharyngeal erythema.   Neck:      Thyroid: No thyroid mass, thyromegaly or thyroid tenderness.   Cardiovascular:      Rate and Rhythm: Normal rate and regular rhythm.      Heart sounds: No murmur heard.    No friction rub. No gallop.   Pulmonary:      Effort: Pulmonary effort is normal.      Breath sounds: Normal breath sounds. No wheezing or rhonchi.   Lymphadenopathy:      Cervical: No cervical adenopathy.   Skin:     General: Skin is warm and dry.   Neurological:      Mental Status: She is alert and oriented to person, place, and time.      Cranial Nerves: No cranial nerve deficit.   Psychiatric:         Mood and Affect: Mood and affect normal.         Behavior: Behavior normal.         Thought Content: Thought content normal. Thought content does not include homicidal or suicidal ideation.         Judgment: Judgment normal.        Result Review :                 Assessment and Plan    Diagnoses and all orders for this visit:    1. Left ear pain (Primary)  -     cyclobenzaprine (FLEXERIL) 10 MG tablet; Take 1 tablet by mouth 3 (Three) Times a Day As Needed (jaw pain).  Dispense: 45 tablet; Refill: 0  -     ibuprofen (ADVIL,MOTRIN) 800 MG tablet; Take 1 tablet by mouth Every 8 (Eight) Hours As Needed for Moderate Pain.  Dispense: 45 tablet; Refill: 0    2. Jaw pain  -     cyclobenzaprine  (FLEXERIL) 10 MG tablet; Take 1 tablet by mouth 3 (Three) Times a Day As Needed (jaw pain).  Dispense: 45 tablet; Refill: 0  -     ibuprofen (ADVIL,MOTRIN) 800 MG tablet; Take 1 tablet by mouth Every 8 (Eight) Hours As Needed for Moderate Pain.  Dispense: 45 tablet; Refill: 0    Advised her that she does not have an ear infection currently.  I discussed that jaw pain can radiate to the ear.  I will start her on ibuprofen 800 mg 3 times a day as needed, advised not to take any over-the-counter NSAIDs, advised to take with food.  I will also start her on Flexeril 10 mg 3 times a day as needed, advised not to take if she is going to be driving or working.  Recommended a soft diet until feeling better.  Handouts provided, see AVS.    Follow Up   Return if symptoms worsen or fail to improve.  Patient was given instructions and counseling regarding her condition or for health maintenance advice. Please see specific information pulled into the AVS if appropriate.     Parts of this note are electronic transcriptions/translations of spoken language to printed text using the Dragon Dictation system.      Macarena Carter, APRN  02/20/2023

## 2023-02-20 ENCOUNTER — OFFICE VISIT (OUTPATIENT)
Dept: FAMILY MEDICINE CLINIC | Facility: CLINIC | Age: 43
End: 2023-02-20
Payer: COMMERCIAL

## 2023-02-20 VITALS
BODY MASS INDEX: 43.98 KG/M2 | OXYGEN SATURATION: 98 % | TEMPERATURE: 97.6 F | SYSTOLIC BLOOD PRESSURE: 148 MMHG | HEIGHT: 63 IN | DIASTOLIC BLOOD PRESSURE: 87 MMHG | HEART RATE: 79 BPM | WEIGHT: 248.2 LBS

## 2023-02-20 DIAGNOSIS — H92.02 LEFT EAR PAIN: Primary | ICD-10-CM

## 2023-02-20 DIAGNOSIS — R68.84 JAW PAIN: ICD-10-CM

## 2023-02-20 PROCEDURE — 99213 OFFICE O/P EST LOW 20 MIN: CPT | Performed by: NURSE PRACTITIONER

## 2023-02-20 RX ORDER — IBUPROFEN 800 MG/1
800 TABLET ORAL EVERY 8 HOURS PRN
Qty: 45 TABLET | Refills: 0 | Status: SHIPPED | OUTPATIENT
Start: 2023-02-20

## 2023-02-20 RX ORDER — CYCLOBENZAPRINE HCL 10 MG
10 TABLET ORAL 3 TIMES DAILY PRN
Qty: 45 TABLET | Refills: 0 | Status: SHIPPED | OUTPATIENT
Start: 2023-02-20

## 2023-03-09 DIAGNOSIS — F41.9 ANXIETY: ICD-10-CM

## 2023-03-09 DIAGNOSIS — E78.2 MIXED HYPERLIPIDEMIA: Chronic | ICD-10-CM

## 2023-03-09 RX ORDER — CITALOPRAM 40 MG/1
TABLET ORAL
Qty: 30 TABLET | Refills: 0 | Status: SHIPPED | OUTPATIENT
Start: 2023-03-09 | End: 2023-04-06

## 2023-03-09 RX ORDER — FENOFIBRATE 160 MG/1
TABLET ORAL
Qty: 30 TABLET | Refills: 0 | Status: SHIPPED | OUTPATIENT
Start: 2023-03-09 | End: 2023-04-06

## 2023-03-09 RX ORDER — SIMVASTATIN 40 MG
TABLET ORAL
Qty: 30 TABLET | Refills: 0 | Status: SHIPPED | OUTPATIENT
Start: 2023-03-09 | End: 2023-04-06

## 2023-04-05 DIAGNOSIS — F41.9 ANXIETY: ICD-10-CM

## 2023-04-05 DIAGNOSIS — E78.2 MIXED HYPERLIPIDEMIA: Chronic | ICD-10-CM

## 2023-04-06 RX ORDER — CITALOPRAM 40 MG/1
TABLET ORAL
Qty: 30 TABLET | Refills: 0 | Status: SHIPPED | OUTPATIENT
Start: 2023-04-06 | End: 2023-04-17 | Stop reason: SDUPTHER

## 2023-04-06 RX ORDER — FENOFIBRATE 160 MG/1
TABLET ORAL
Qty: 30 TABLET | Refills: 0 | Status: SHIPPED | OUTPATIENT
Start: 2023-04-06 | End: 2023-04-17 | Stop reason: SDUPTHER

## 2023-04-06 RX ORDER — SIMVASTATIN 40 MG
TABLET ORAL
Qty: 30 TABLET | Refills: 0 | Status: SHIPPED | OUTPATIENT
Start: 2023-04-06 | End: 2023-04-17 | Stop reason: SDUPTHER

## 2023-04-17 ENCOUNTER — OFFICE VISIT (OUTPATIENT)
Dept: FAMILY MEDICINE CLINIC | Facility: CLINIC | Age: 43
End: 2023-04-17
Payer: COMMERCIAL

## 2023-04-17 VITALS
SYSTOLIC BLOOD PRESSURE: 136 MMHG | HEART RATE: 81 BPM | TEMPERATURE: 98.1 F | OXYGEN SATURATION: 99 % | BODY MASS INDEX: 43.84 KG/M2 | DIASTOLIC BLOOD PRESSURE: 80 MMHG | HEIGHT: 63 IN | WEIGHT: 247.4 LBS

## 2023-04-17 DIAGNOSIS — F41.9 ANXIETY: ICD-10-CM

## 2023-04-17 DIAGNOSIS — E66.01 CLASS 3 SEVERE OBESITY WITH SERIOUS COMORBIDITY AND BODY MASS INDEX (BMI) OF 40.0 TO 44.9 IN ADULT, UNSPECIFIED OBESITY TYPE: Primary | ICD-10-CM

## 2023-04-17 DIAGNOSIS — I10 PRIMARY HYPERTENSION: ICD-10-CM

## 2023-04-17 DIAGNOSIS — Z83.49 FAMILY HISTORY OF THYROID DISEASE: ICD-10-CM

## 2023-04-17 DIAGNOSIS — E78.2 MIXED HYPERLIPIDEMIA: Chronic | ICD-10-CM

## 2023-04-17 PROBLEM — I15.9 SECONDARY HYPERTENSION: Status: ACTIVE | Noted: 2023-01-16

## 2023-04-17 PROCEDURE — 99214 OFFICE O/P EST MOD 30 MIN: CPT | Performed by: NURSE PRACTITIONER

## 2023-04-17 RX ORDER — SIMVASTATIN 40 MG
40 TABLET ORAL NIGHTLY
Qty: 90 TABLET | Refills: 1 | Status: SHIPPED | OUTPATIENT
Start: 2023-04-17

## 2023-04-17 RX ORDER — CITALOPRAM 40 MG/1
40 TABLET ORAL DAILY
Qty: 90 TABLET | Refills: 1 | Status: SHIPPED | OUTPATIENT
Start: 2023-04-17

## 2023-04-17 RX ORDER — FENOFIBRATE 160 MG/1
160 TABLET ORAL NIGHTLY
Qty: 90 TABLET | Refills: 1 | Status: SHIPPED | OUTPATIENT
Start: 2023-04-17

## 2023-04-17 NOTE — ASSESSMENT & PLAN NOTE
Patient's (Body mass index is 43.82 kg/m².) indicates that they are morbidly/severely obese (BMI > 40 or > 35 with obesity - related health condition) with health conditions that include hypertension and dyslipidemias . Weight is unchanged. BMI  is above average; BMI management plan is completed. We discussed low calorie, low carb based diet program, portion control, increasing exercise, management of depression/anxiety/stress to control compensatory eating and pharmacologic options including Starting metformin.  We also discussed possibly starting Wegovy, information given to patient.  We discussed side effects to metformin such as nausea, bloating and diarrhea, patient will notify if any side effect.

## 2023-04-17 NOTE — PROGRESS NOTES
"Chief Complaint  Hypertension, Hyperlipidemia, and Anxiety    Subjective          Natalya Bynum, 43 y.o. female presents to [facility]  History of Present Illness   Patient presents today for 3-month follow on hypertension.  She was newly diagnosed with hypertension 3 months ago.  She was to work on weight loss, diet changes and to stop smoking.  She did stop smoking.  She thinks that the Wellbutrin has helped her to quit smoking.  She has not lost any weight but she did not gain any weight.  Her blood pressure did improve but is still borderline elevated at 136/80.    Anxiety and difficulty concentrating: She is doing much better on Wellbutrin.  She is also on Celexa 40 mg daily.    Hyperlipidemia: She is on simvastatin and fenofibrate, needs refills.  She needs fasting lipid panel but she is not fasting today.    PHQ-2 Depression Screening  Little interest or pleasure in doing things? 0-->not at all   Feeling down, depressed, or hopeless? 0-->not at all   PHQ-2 Total Score 0       Tobacco Use: Medium Risk   • Smoking Tobacco Use: Former   • Smokeless Tobacco Use: Never   • Passive Exposure: Not on file      Natalya Bynum  reports that she quit smoking about 4 months ago. Her smoking use included cigarettes. She started smoking about 12 years ago. She has a 2.50 pack-year smoking history. She has never used smokeless tobacco.      Objective   Vital Signs:   /80   Pulse 81   Temp 98.1 °F (36.7 °C)   Ht 160 cm (63\")   Wt 112 kg (247 lb 6.4 oz)   SpO2 99%   BMI 43.82 kg/m²       Current Outpatient Medications:   •  buPROPion XL (Wellbutrin XL) 300 MG 24 hr tablet, Take 1 tablet by mouth Every Morning., Disp: 90 tablet, Rfl: 1  •  citalopram (CeleXA) 40 MG tablet, Take 1 tablet by mouth Daily., Disp: 90 tablet, Rfl: 1  •  estradiol (Estrace) 2 MG tablet, Take 1 tablet by mouth Daily., Disp: 90 tablet, Rfl: 3  •  fenofibrate 160 MG tablet, Take 1 tablet by mouth " Every Night., Disp: 90 tablet, Rfl: 1  •  simvastatin (ZOCOR) 40 MG tablet, Take 1 tablet by mouth Every Night., Disp: 90 tablet, Rfl: 1  •  metFORMIN (Glucophage) 500 MG tablet, Take 1 tablet by mouth Daily With Dinner., Disp: 30 tablet, Rfl: 2   Past Medical History:   Diagnosis Date   • Abnormal Pap smear of cervix    • Cervical dysplasia    • H. pylori infection    • HPV (human papilloma virus) infection    • Hyperlipidemia    • Migraine       Physical Exam  Vitals reviewed.   Constitutional:       Appearance: Normal appearance. She is well-developed. She is morbidly obese.   Neck:      Thyroid: No thyroid mass, thyromegaly or thyroid tenderness.   Cardiovascular:      Rate and Rhythm: Normal rate and regular rhythm.      Heart sounds: No murmur heard.    No friction rub. No gallop.   Pulmonary:      Effort: Pulmonary effort is normal.      Breath sounds: Normal breath sounds. No wheezing or rhonchi.   Lymphadenopathy:      Cervical: No cervical adenopathy.   Skin:     General: Skin is warm and dry.   Neurological:      Mental Status: She is alert and oriented to person, place, and time.      Cranial Nerves: No cranial nerve deficit.   Psychiatric:         Mood and Affect: Mood and affect normal.         Behavior: Behavior normal.         Thought Content: Thought content normal. Thought content does not include homicidal or suicidal ideation.         Judgment: Judgment normal.        Result Review :   {The following data was reviewed by SERA Read    No Images in the past 120 days found..    CMP   CMP        9/20/2022    06:58   CMP   Glucose 85     BUN 20     Creatinine 1.21     EGFR 57.1     Sodium 137     Potassium 4.6     Chloride 104     Calcium 9.3     Total Protein 6.8     Albumin 4.30     Globulin 2.5     Total Bilirubin 0.3     Alkaline Phosphatase 45     AST (SGOT) 22     ALT (SGPT) 15     Albumin/Globulin Ratio 1.7     BUN/Creatinine Ratio 16.5     Anion Gap 9.4       LIPID   Lipid  Panel        9/20/2022    06:58   Lipid Panel   Total Cholesterol 174     Triglycerides 181     HDL Cholesterol 36     VLDL Cholesterol 32     LDL Cholesterol  106     LDL/HDL Ratio 2.83                Assessment and Plan    Diagnoses and all orders for this visit:    1. Class 3 severe obesity with serious comorbidity and body mass index (BMI) of 40.0 to 44.9 in adult, unspecified obesity type (Primary)  Assessment & Plan:  Patient's (Body mass index is 43.82 kg/m².) indicates that they are morbidly/severely obese (BMI > 40 or > 35 with obesity - related health condition) with health conditions that include hypertension and dyslipidemias . Weight is unchanged. BMI  is above average; BMI management plan is completed. We discussed low calorie, low carb based diet program, portion control, increasing exercise, management of depression/anxiety/stress to control compensatory eating and pharmacologic options including Starting metformin.  We also discussed possibly starting Wegovy, information given to patient.  We discussed side effects to metformin such as nausea, bloating and diarrhea, patient will notify if any side effect.     Orders:  -     Hemoglobin A1c; Future  -     Insulin, Free & Total, Serum; Future  -     metFORMIN (Glucophage) 500 MG tablet; Take 1 tablet by mouth Daily With Dinner.  Dispense: 30 tablet; Refill: 2    2. Anxiety  Assessment & Plan:  Anxiety is improving with Celexa and Wellbutrin.    Orders:  -     citalopram (CeleXA) 40 MG tablet; Take 1 tablet by mouth Daily.  Dispense: 90 tablet; Refill: 1    3. Mixed hyperlipidemia  Assessment & Plan:  Lipid abnormalities are improving with treatment.  Pharmacotherapy as ordered.  She is not fasting today so she will return next week for fasting labs.  Lipids will be reassessed in 6 months.    Orders:  -     Comprehensive Metabolic Panel; Future  -     Lipid Panel; Future  -     fenofibrate 160 MG tablet; Take 1 tablet by mouth Every Night.  Dispense: 90  tablet; Refill: 1  -     simvastatin (ZOCOR) 40 MG tablet; Take 1 tablet by mouth Every Night.  Dispense: 90 tablet; Refill: 1    4. Family history of thyroid disease  Comments:  Due to her family history and obesity, will check thyroid antibodies with her labs.  Orders:  -     TSH+Free T4; Future  -     Thyroid Peroxidase Antibody; Future    5. Primary hypertension  Assessment & Plan:  Hypertension is improving with lifestyle modifications.  Dietary sodium restriction.  Weight loss.  Blood pressure will be reassessed in 3 months.    Orders:  -     CBC Auto Differential; Future  -     Comprehensive Metabolic Panel; Future  -     Lipid Panel; Future      Follow Up   Return in about 3 months (around 7/17/2023) for Next scheduled follow up obesity/annual phy, Fasting Labs in 1-2 weeks.  Patient was given instructions and counseling regarding her condition or for health maintenance advice. Please see specific information pulled into the AVS if appropriate.     Parts of this note are electronic transcriptions/translations of spoken language to printed text using the Dragon Dictation system.      Macarena Carter, SERA  04/17/2023

## 2023-04-17 NOTE — ASSESSMENT & PLAN NOTE
Lipid abnormalities are improving with treatment.  Pharmacotherapy as ordered.  She is not fasting today so she will return next week for fasting labs.  Lipids will be reassessed in 6 months.

## 2023-04-17 NOTE — ASSESSMENT & PLAN NOTE
Hypertension is improving with lifestyle modifications.  Dietary sodium restriction.  Weight loss.  Blood pressure will be reassessed in 3 months.   Kristyn Russo(Attending)

## 2023-04-25 ENCOUNTER — LAB (OUTPATIENT)
Dept: FAMILY MEDICINE CLINIC | Facility: CLINIC | Age: 43
End: 2023-04-25
Payer: COMMERCIAL

## 2023-04-25 DIAGNOSIS — E66.01 CLASS 3 SEVERE OBESITY WITH SERIOUS COMORBIDITY AND BODY MASS INDEX (BMI) OF 40.0 TO 44.9 IN ADULT, UNSPECIFIED OBESITY TYPE: ICD-10-CM

## 2023-04-25 DIAGNOSIS — I10 PRIMARY HYPERTENSION: ICD-10-CM

## 2023-04-25 DIAGNOSIS — Z83.49 FAMILY HISTORY OF THYROID DISEASE: ICD-10-CM

## 2023-04-25 DIAGNOSIS — E78.2 MIXED HYPERLIPIDEMIA: Chronic | ICD-10-CM

## 2023-04-25 LAB
ALBUMIN SERPL-MCNC: 4 G/DL (ref 3.5–5.2)
ALBUMIN/GLOB SERPL: 1.6 G/DL
ALP SERPL-CCNC: 43 U/L (ref 39–117)
ALT SERPL W P-5'-P-CCNC: 17 U/L (ref 1–33)
ANION GAP SERPL CALCULATED.3IONS-SCNC: 7.1 MMOL/L (ref 5–15)
AST SERPL-CCNC: 17 U/L (ref 1–32)
BASOPHILS # BLD AUTO: 0.11 10*3/MM3 (ref 0–0.2)
BASOPHILS NFR BLD AUTO: 1 % (ref 0–1.5)
BILIRUB SERPL-MCNC: <0.2 MG/DL (ref 0–1.2)
BUN SERPL-MCNC: 15 MG/DL (ref 6–20)
BUN/CREAT SERPL: 12.4 (ref 7–25)
CALCIUM SPEC-SCNC: 9.4 MG/DL (ref 8.6–10.5)
CHLORIDE SERPL-SCNC: 107 MMOL/L (ref 98–107)
CHOLEST SERPL-MCNC: 169 MG/DL (ref 0–200)
CO2 SERPL-SCNC: 24.9 MMOL/L (ref 22–29)
CREAT SERPL-MCNC: 1.21 MG/DL (ref 0.57–1)
DEPRECATED RDW RBC AUTO: 39.5 FL (ref 37–54)
EGFRCR SERPLBLD CKD-EPI 2021: 56.8 ML/MIN/1.73
EOSINOPHIL # BLD AUTO: 0.34 10*3/MM3 (ref 0–0.4)
EOSINOPHIL NFR BLD AUTO: 3.1 % (ref 0.3–6.2)
ERYTHROCYTE [DISTWIDTH] IN BLOOD BY AUTOMATED COUNT: 12.4 % (ref 12.3–15.4)
GLOBULIN UR ELPH-MCNC: 2.5 GM/DL
GLUCOSE SERPL-MCNC: 83 MG/DL (ref 65–99)
HBA1C MFR BLD: 5.1 % (ref 4.8–5.6)
HCT VFR BLD AUTO: 37.8 % (ref 34–46.6)
HDLC SERPL-MCNC: 46 MG/DL (ref 40–60)
HGB BLD-MCNC: 12.8 G/DL (ref 12–15.9)
IMM GRANULOCYTES # BLD AUTO: 0.04 10*3/MM3 (ref 0–0.05)
IMM GRANULOCYTES NFR BLD AUTO: 0.4 % (ref 0–0.5)
LDLC SERPL CALC-MCNC: 101 MG/DL (ref 0–100)
LDLC/HDLC SERPL: 2.15 {RATIO}
LYMPHOCYTES # BLD AUTO: 2.88 10*3/MM3 (ref 0.7–3.1)
LYMPHOCYTES NFR BLD AUTO: 26.4 % (ref 19.6–45.3)
MCH RBC QN AUTO: 29.7 PG (ref 26.6–33)
MCHC RBC AUTO-ENTMCNC: 33.9 G/DL (ref 31.5–35.7)
MCV RBC AUTO: 87.7 FL (ref 79–97)
MONOCYTES # BLD AUTO: 0.72 10*3/MM3 (ref 0.1–0.9)
MONOCYTES NFR BLD AUTO: 6.6 % (ref 5–12)
NEUTROPHILS NFR BLD AUTO: 6.8 10*3/MM3 (ref 1.7–7)
NEUTROPHILS NFR BLD AUTO: 62.5 % (ref 42.7–76)
NRBC BLD AUTO-RTO: 0 /100 WBC (ref 0–0.2)
PLATELET # BLD AUTO: 357 10*3/MM3 (ref 140–450)
PMV BLD AUTO: 11.1 FL (ref 6–12)
POTASSIUM SERPL-SCNC: 4.5 MMOL/L (ref 3.5–5.2)
PROT SERPL-MCNC: 6.5 G/DL (ref 6–8.5)
RBC # BLD AUTO: 4.31 10*6/MM3 (ref 3.77–5.28)
SODIUM SERPL-SCNC: 139 MMOL/L (ref 136–145)
T4 FREE SERPL-MCNC: 1.07 NG/DL (ref 0.93–1.7)
TRIGL SERPL-MCNC: 121 MG/DL (ref 0–150)
TSH SERPL DL<=0.05 MIU/L-ACNC: 3.4 UIU/ML (ref 0.27–4.2)
VLDLC SERPL-MCNC: 22 MG/DL (ref 5–40)
WBC NRBC COR # BLD: 10.89 10*3/MM3 (ref 3.4–10.8)

## 2023-04-25 PROCEDURE — 83527 ASSAY OF INSULIN: CPT | Performed by: NURSE PRACTITIONER

## 2023-04-25 PROCEDURE — 80050 GENERAL HEALTH PANEL: CPT | Performed by: NURSE PRACTITIONER

## 2023-04-25 PROCEDURE — 84439 ASSAY OF FREE THYROXINE: CPT | Performed by: NURSE PRACTITIONER

## 2023-04-25 PROCEDURE — 80061 LIPID PANEL: CPT | Performed by: NURSE PRACTITIONER

## 2023-04-25 PROCEDURE — 86376 MICROSOMAL ANTIBODY EACH: CPT | Performed by: NURSE PRACTITIONER

## 2023-04-25 PROCEDURE — 83036 HEMOGLOBIN GLYCOSYLATED A1C: CPT | Performed by: NURSE PRACTITIONER

## 2023-04-25 PROCEDURE — 83525 ASSAY OF INSULIN: CPT | Performed by: NURSE PRACTITIONER

## 2023-04-26 LAB — THYROPEROXIDASE AB SERPL-ACNC: <9 IU/ML (ref 0–34)

## 2023-05-03 ENCOUNTER — HOSPITAL ENCOUNTER (OUTPATIENT)
Dept: MAMMOGRAPHY | Facility: HOSPITAL | Age: 43
Discharge: HOME | End: 2023-05-03
Admitting: OBSTETRICS & GYNECOLOGY
Payer: COMMERCIAL

## 2023-05-03 DIAGNOSIS — Z01.419 WOMEN'S ANNUAL ROUTINE GYNECOLOGICAL EXAMINATION: ICD-10-CM

## 2023-05-03 LAB
INSULIN FREE SERPL-ACNC: 7.2 UU/ML
INSULIN SERPL-ACNC: 7.5 UU/ML

## 2023-05-03 PROCEDURE — 77067 SCR MAMMO BI INCL CAD: CPT

## 2023-05-03 PROCEDURE — 77063 BREAST TOMOSYNTHESIS BI: CPT

## 2023-05-04 ENCOUNTER — TELEPHONE (OUTPATIENT)
Dept: FAMILY MEDICINE CLINIC | Facility: CLINIC | Age: 43
End: 2023-05-04
Payer: COMMERCIAL

## 2023-05-04 DIAGNOSIS — E78.2 MIXED HYPERLIPIDEMIA: Chronic | ICD-10-CM

## 2023-05-04 DIAGNOSIS — E66.01 CLASS 3 SEVERE OBESITY WITH SERIOUS COMORBIDITY AND BODY MASS INDEX (BMI) OF 40.0 TO 44.9 IN ADULT, UNSPECIFIED OBESITY TYPE: ICD-10-CM

## 2023-05-04 RX ORDER — FENOFIBRATE 160 MG/1
TABLET ORAL
Qty: 30 TABLET | Refills: 0 | Status: SHIPPED | OUTPATIENT
Start: 2023-05-04

## 2023-08-18 ENCOUNTER — OFFICE VISIT (OUTPATIENT)
Dept: FAMILY MEDICINE CLINIC | Facility: CLINIC | Age: 43
End: 2023-08-18
Payer: COMMERCIAL

## 2023-08-18 VITALS
BODY MASS INDEX: 44.19 KG/M2 | SYSTOLIC BLOOD PRESSURE: 150 MMHG | HEART RATE: 82 BPM | DIASTOLIC BLOOD PRESSURE: 92 MMHG | WEIGHT: 249.4 LBS | HEIGHT: 63 IN | TEMPERATURE: 98.3 F | OXYGEN SATURATION: 97 %

## 2023-08-18 DIAGNOSIS — E66.01 CLASS 3 SEVERE OBESITY WITH SERIOUS COMORBIDITY AND BODY MASS INDEX (BMI) OF 40.0 TO 44.9 IN ADULT, UNSPECIFIED OBESITY TYPE: ICD-10-CM

## 2023-08-18 DIAGNOSIS — F41.9 ANXIETY: ICD-10-CM

## 2023-08-18 DIAGNOSIS — E78.2 MIXED HYPERLIPIDEMIA: Chronic | ICD-10-CM

## 2023-08-18 DIAGNOSIS — I10 PRIMARY HYPERTENSION: Primary | ICD-10-CM

## 2023-08-18 PROCEDURE — 99214 OFFICE O/P EST MOD 30 MIN: CPT | Performed by: NURSE PRACTITIONER

## 2023-08-18 RX ORDER — HYDROCHLOROTHIAZIDE 25 MG/1
25 TABLET ORAL DAILY
Qty: 90 TABLET | Refills: 1 | Status: SHIPPED | OUTPATIENT
Start: 2023-08-18

## 2023-08-18 RX ORDER — SIMVASTATIN 40 MG
40 TABLET ORAL NIGHTLY
Qty: 90 TABLET | Refills: 1 | Status: SHIPPED | OUTPATIENT
Start: 2023-08-18

## 2023-08-18 RX ORDER — SEMAGLUTIDE 0.25 MG/.5ML
0.25 INJECTION, SOLUTION SUBCUTANEOUS
Qty: 2 ML | Refills: 0 | Status: SHIPPED | OUTPATIENT
Start: 2023-08-18

## 2023-08-18 RX ORDER — CITALOPRAM 40 MG/1
40 TABLET ORAL DAILY
Qty: 90 TABLET | Refills: 1 | Status: SHIPPED | OUTPATIENT
Start: 2023-08-18

## 2023-08-18 NOTE — PROGRESS NOTES
"Chief Complaint  Hypertension and Weight Check    Subjective          Natalya Bynum, 43 y.o. female presents to [facility]  History of Present Illness   Patient presents today for follow-up on hypertension and weight loss management.    Hypertension: Her blood pressure is still above goal today.  She is on hydrochlorothiazide 12.5 mg daily.    Body mass index is 44.18 kg/mý.  She has not lost any weight.  She states she is trying to follow a diet with low calorie and low-carb most days but has not been doing it every day.  She also states she has not been exercising lately due to working.    Hyperlipidemia she is currently on simvastatin 40 mg every evening.  She is needing a refill.    Anxiety: She is doing well on citalopram 40 mg daily and Wellbutrin  mg daily.     Tobacco Use: Medium Risk    Smoking Tobacco Use: Former    Smokeless Tobacco Use: Never    Passive Exposure: Not on file      Objective   Vital Signs:   /92   Pulse 82   Temp 98.3 øF (36.8 øC)   Ht 160 cm (63\")   Wt 113 kg (249 lb 6.4 oz)   SpO2 97%   BMI 44.18 kg/mý       Current Outpatient Medications:     buPROPion XL (Wellbutrin XL) 300 MG 24 hr tablet, Take 1 tablet by mouth Every Morning., Disp: 90 tablet, Rfl: 1    citalopram (CeleXA) 40 MG tablet, Take 1 tablet by mouth Daily., Disp: 90 tablet, Rfl: 1    estradiol (Estrace) 2 MG tablet, Take 1 tablet by mouth Daily., Disp: 90 tablet, Rfl: 3    fenofibrate 160 MG tablet, Take 1 tablet by mouth Every Night., Disp: 90 tablet, Rfl: 1    hydroCHLOROthiazide (HYDRODIURIL) 25 MG tablet, Take 1 tablet by mouth Daily., Disp: 90 tablet, Rfl: 1    simvastatin (ZOCOR) 40 MG tablet, Take 1 tablet by mouth Every Night., Disp: 90 tablet, Rfl: 1    Semaglutide-Weight Management (Wegovy) 0.25 MG/0.5ML solution auto-injector, Inject 0.25 mg under the skin into the appropriate area as directed Every 7 (Seven) Days., Disp: 2 mL, Rfl: 0   Past Medical History: "   Diagnosis Date    Abnormal Pap smear of cervix     Cervical dysplasia     H. pylori infection     HPV (human papilloma virus) infection     Hyperlipidemia     Migraine       Physical Exam  Vitals reviewed.   Constitutional:       Appearance: Normal appearance. She is well-developed. She is morbidly obese.   Neck:      Thyroid: No thyroid mass, thyromegaly or thyroid tenderness.   Cardiovascular:      Rate and Rhythm: Normal rate and regular rhythm.      Heart sounds: No murmur heard.    No friction rub. No gallop.   Pulmonary:      Effort: Pulmonary effort is normal.      Breath sounds: Normal breath sounds. No wheezing or rhonchi.   Lymphadenopathy:      Cervical: No cervical adenopathy.   Skin:     General: Skin is warm and dry.   Neurological:      Mental Status: She is alert and oriented to person, place, and time.      Cranial Nerves: No cranial nerve deficit.   Psychiatric:         Mood and Affect: Mood and affect normal.         Behavior: Behavior normal.         Thought Content: Thought content normal. Thought content does not include homicidal or suicidal ideation.         Judgment: Judgment normal.      Result Review :   {The following data was reviewed by SERA Read    Mammo Screening Digital Tomosynthesis Bilateral With CAD    Result Date: 5/4/2023   Benign mammogram. Suggest routine mammographic screening.  RECOMMENDATION(S):  ROUTINE MAMMOGRAM AND CLINICAL EVALUATION IN 12 MONTHS.   BIRADS:  DIAGNOSTIC CATEGORY 1--NEGATIVE.   BREAST COMPOSITION: Scattered areas fibroglandular density.  PLEASE NOTE:  A NORMAL MAMMOGRAM DOES NOT EXCLUDE THE POSSIBILITY OF BREAST CANCER. ANY CLINICALLY SUSPICIOUS PALPABLE LUMP SHOULD BE BIOPSIED.      PALLAVI LUJAN MD       Electronically Signed and Approved By: PALLAVI LUJAN MD on 5/04/2023 at 12:07               Common Labs   Common labs          9/20/2022    06:58 4/25/2023    07:29   Common Labs   Glucose 85  83    BUN 20  15    Creatinine 1.21   1.21    Sodium 137  139    Potassium 4.6  4.5    Chloride 104  107    Calcium 9.3  9.4    Albumin 4.30  4.0    Total Bilirubin 0.3  <0.2    Alkaline Phosphatase 45  43    AST (SGOT) 22  17    ALT (SGPT) 15  17    WBC  10.89    Hemoglobin  12.8    Hematocrit  37.8    Platelets  357    Total Cholesterol 174  169    Triglycerides 181  121    HDL Cholesterol 36  46    LDL Cholesterol  106  101    Hemoglobin A1C  5.10               Assessment and Plan    Diagnoses and all orders for this visit:    1. Primary hypertension (Primary)  Assessment & Plan:  Hypertension is unchanged.  Medication changes per orders.  I will increase hydrochlorothiazide dose to 25 mg daily.  Blood pressure will be reassessed in 3 months.    Orders:  -     hydroCHLOROthiazide (HYDRODIURIL) 25 MG tablet; Take 1 tablet by mouth Daily.  Dispense: 90 tablet; Refill: 1    2. Anxiety  Assessment & Plan:  Currently stable, will continue current doses of citalopram and Wellbutrin.    Orders:  -     citalopram (CeleXA) 40 MG tablet; Take 1 tablet by mouth Daily.  Dispense: 90 tablet; Refill: 1    3. Mixed hyperlipidemia  Assessment & Plan:  Lipid abnormalities are improving with treatment.  Pharmacotherapy as ordered.  Lipids will be reassessed in 3 months.    Orders:  -     simvastatin (ZOCOR) 40 MG tablet; Take 1 tablet by mouth Every Night.  Dispense: 90 tablet; Refill: 1    4. Class 3 severe obesity with serious comorbidity and body mass index (BMI) of 40.0 to 44.9 in adult, unspecified obesity type  Assessment & Plan:  Patient's (Body mass index is 44.18 kg/mý.) indicates that they are morbidly/severely obese (BMI > 40 or > 35 with obesity - related health condition) with health conditions that include hypertension and dyslipidemias . Weight is unchanged. BMI  is above average; BMI management plan is completed. We discussed low calorie, low carb based diet program, portion control, increasing exercise, and pharmacologic options including Wegovy .  We  discussed Wegovy and she would like to try it.  We will see if it will be cost effective for her.    Orders:  -     Semaglutide-Weight Management (Wegovy) 0.25 MG/0.5ML solution auto-injector; Inject 0.25 mg under the skin into the appropriate area as directed Every 7 (Seven) Days.  Dispense: 2 mL; Refill: 0        Follow Up   Return in about 3 months (around 11/18/2023) for Next scheduled follow up HTN, Obesity, .  Patient was given instructions and counseling regarding her condition or for health maintenance advice. Please see specific information pulled into the AVS if appropriate.     Parts of this note are electronic transcriptions/translations of spoken language to printed text using the Dragon Dictation system.      Macarena Carter, SERA  08/18/2023

## 2023-08-19 NOTE — ASSESSMENT & PLAN NOTE
Hypertension is unchanged.  Medication changes per orders.  I will increase hydrochlorothiazide dose to 25 mg daily.  Blood pressure will be reassessed in 3 months.

## 2023-08-19 NOTE — ASSESSMENT & PLAN NOTE
Patient's (Body mass index is 44.18 kg/mý.) indicates that they are morbidly/severely obese (BMI > 40 or > 35 with obesity - related health condition) with health conditions that include hypertension and dyslipidemias . Weight is unchanged. BMI  is above average; BMI management plan is completed. We discussed low calorie, low carb based diet program, portion control, increasing exercise, and pharmacologic options including Wegovy .  We discussed Wegovy and she would like to try it.  We will see if it will be cost effective for her.

## 2023-09-11 NOTE — ADDENDUM NOTE
Addended by: RAMONA CHAN on: 11/22/2021 08:58 AM     Modules accepted: Orders     Sotyktu Pregnancy And Lactation Text: There is insufficient data to evaluate whether or not Sotyktu is safe to use during pregnancy.   It is not known if Sotyktu passes into breast milk and whether or not it is safe to use when breastfeeding.

## 2023-11-27 ENCOUNTER — OFFICE VISIT (OUTPATIENT)
Dept: FAMILY MEDICINE CLINIC | Facility: CLINIC | Age: 43
End: 2023-11-27
Payer: COMMERCIAL

## 2023-11-27 VITALS
HEIGHT: 63 IN | OXYGEN SATURATION: 99 % | SYSTOLIC BLOOD PRESSURE: 142 MMHG | DIASTOLIC BLOOD PRESSURE: 92 MMHG | BODY MASS INDEX: 45.57 KG/M2 | TEMPERATURE: 97.8 F | HEART RATE: 89 BPM | WEIGHT: 257.2 LBS

## 2023-11-27 DIAGNOSIS — J01.90 ACUTE NON-RECURRENT SINUSITIS, UNSPECIFIED LOCATION: ICD-10-CM

## 2023-11-27 DIAGNOSIS — I10 PRIMARY HYPERTENSION: Primary | ICD-10-CM

## 2023-11-27 DIAGNOSIS — E78.2 MIXED HYPERLIPIDEMIA: Chronic | ICD-10-CM

## 2023-11-27 DIAGNOSIS — E66.01 CLASS 3 SEVERE OBESITY WITH SERIOUS COMORBIDITY AND BODY MASS INDEX (BMI) OF 45.0 TO 49.9 IN ADULT, UNSPECIFIED OBESITY TYPE: ICD-10-CM

## 2023-11-27 PROCEDURE — 99214 OFFICE O/P EST MOD 30 MIN: CPT | Performed by: NURSE PRACTITIONER

## 2023-11-27 RX ORDER — LOSARTAN POTASSIUM 25 MG/1
25 TABLET ORAL DAILY
Qty: 30 TABLET | Refills: 3 | Status: SHIPPED | OUTPATIENT
Start: 2023-11-27

## 2023-11-27 RX ORDER — CHLORCYCLIZINE HYDROCHLORIDE AND PSEUDOEPHEDRINE HYDROCHLORIDE 25; 60 MG/1; MG/1
1 TABLET ORAL EVERY 4 HOURS PRN
Qty: 16 TABLET | Refills: 0 | COMMUNITY
Start: 2023-11-27

## 2023-11-27 NOTE — ASSESSMENT & PLAN NOTE
Hypertension is unchanged.  Medication changes per orders.  I will start her on losartan 25 mg daily and she is to continue hydrochlorothiazide 25 mg daily.  Blood pressure will be reassessed in 3 months.

## 2023-11-27 NOTE — PROGRESS NOTES
"Chief Complaint  Hypertension and Hyperlipidemia    Subjective          Natalyarodrigo Bynum, 43 y.o. female presents to [facility]  History of Present Illness   Patient presents today for follow-up on hypertension and weight loss management.    She is complaining of sinus congestion with some bloody nasal drainage.  She denies any sore throat.  She states all of her family has been sick and passing around but no one has tested positive for COVID.    Hypertension: Her blood pressure is still above goal today.  She is on hydrochlorothiazide 25 mg daily.  She does not check her blood pressure at home.    Body mass index is 45.56 kg/m².  She has been tested for insulin resistance and diabetes and negative for both.  She states she has not been exercising lately because she hurt her hip.  She did see a chiropractor who adjusted her hip and that is better.  Her insurance would not cover the Wegovy weight loss medication.    Hyperlipidemia she is currently on simvastatin 40 mg and fenofibrate 160 mg every evening.  She is due for lab workup but she is not fasting today.    Anxiety: She is doing well on citalopram 40 mg daily and Wellbutrin  mg daily.       Tobacco Use: Medium Risk (11/27/2023)    Patient History     Smoking Tobacco Use: Former     Smokeless Tobacco Use: Never     Passive Exposure: Not on file      Objective   Vital Signs:   /92   Pulse 89   Temp 97.8 °F (36.6 °C)   Ht 160 cm (63\")   Wt 117 kg (257 lb 3.2 oz)   SpO2 99%   BMI 45.56 kg/m²       Current Outpatient Medications:     buPROPion XL (Wellbutrin XL) 300 MG 24 hr tablet, Take 1 tablet by mouth Every Morning., Disp: 90 tablet, Rfl: 1    citalopram (CeleXA) 40 MG tablet, Take 1 tablet by mouth Daily., Disp: 90 tablet, Rfl: 1    estradiol (Estrace) 2 MG tablet, Take 1 tablet by mouth Daily., Disp: 90 tablet, Rfl: 3    fenofibrate 160 MG tablet, Take 1 tablet by mouth Every Night., Disp: 90 tablet, Rfl: " 1    hydroCHLOROthiazide (HYDRODIURIL) 25 MG tablet, Take 1 tablet by mouth Daily., Disp: 90 tablet, Rfl: 1    simvastatin (ZOCOR) 40 MG tablet, Take 1 tablet by mouth Every Night., Disp: 90 tablet, Rfl: 1    Chlorcyclizine-Pseudoephed (Stahist AD) 25-60 MG tablet, Take 1 tablet by mouth Every 4 (Four) Hours As Needed (congestion)., Disp: 16 tablet, Rfl: 0    losartan (Cozaar) 25 MG tablet, Take 1 tablet by mouth Daily., Disp: 30 tablet, Rfl: 3   Past Medical History:   Diagnosis Date    Abnormal Pap smear of cervix     Cervical dysplasia     H. pylori infection     HPV (human papilloma virus) infection     Hyperlipidemia     Hypertension 2023    Migraine       Physical Exam  Vitals reviewed.   Constitutional:       Appearance: Normal appearance. She is well-developed. She is morbidly obese.   HENT:      Right Ear: Tympanic membrane, ear canal and external ear normal.      Left Ear: Tympanic membrane, ear canal and external ear normal.      Mouth/Throat:      Mouth: Mucous membranes are moist.      Pharynx: No pharyngeal swelling, oropharyngeal exudate or posterior oropharyngeal erythema.      Comments: Postnasal drainage noted.  Neck:      Thyroid: No thyroid mass, thyromegaly or thyroid tenderness.   Cardiovascular:      Rate and Rhythm: Normal rate and regular rhythm.      Heart sounds: No murmur heard.     No friction rub. No gallop.   Pulmonary:      Effort: Pulmonary effort is normal.      Breath sounds: Normal breath sounds. No wheezing or rhonchi.   Lymphadenopathy:      Cervical: No cervical adenopathy.   Skin:     General: Skin is warm and dry.   Neurological:      Mental Status: She is alert and oriented to person, place, and time.      Cranial Nerves: No cranial nerve deficit.   Psychiatric:         Mood and Affect: Mood and affect normal.         Behavior: Behavior normal.         Thought Content: Thought content normal. Thought content does not include homicidal or suicidal ideation.         Judgment:  Judgment normal.        Result Review :   {The following data was reviewed by SERA Read    Common Labs   Common labs          4/25/2023    07:29   Common Labs   Glucose 83    BUN 15    Creatinine 1.21    Sodium 139    Potassium 4.5    Chloride 107    Calcium 9.4    Albumin 4.0    Total Bilirubin <0.2    Alkaline Phosphatase 43    AST (SGOT) 17    ALT (SGPT) 17    WBC 10.89    Hemoglobin 12.8    Hematocrit 37.8    Platelets 357    Total Cholesterol 169    Triglycerides 121    HDL Cholesterol 46    LDL Cholesterol  101    Hemoglobin A1C 5.10      TSH   TSH          4/25/2023    07:29   TSH   TSH 3.400      FREET4   Lab Results   Component Value Date    FREET4 1.07 04/25/2023            Assessment and Plan    Diagnoses and all orders for this visit:    1. Primary hypertension (Primary)  Assessment & Plan:  Hypertension is unchanged.  Medication changes per orders.  I will start her on losartan 25 mg daily and she is to continue hydrochlorothiazide 25 mg daily.  Blood pressure will be reassessed in 3 months.    Orders:  -     losartan (Cozaar) 25 MG tablet; Take 1 tablet by mouth Daily.  Dispense: 30 tablet; Refill: 3  -     Comprehensive Metabolic Panel; Future  -     Lipid Panel; Future    2. Mixed hyperlipidemia  Assessment & Plan:  Lipid abnormalities are improving with treatment.  Pharmacotherapy as ordered.  Patient will return for fasting labs in 1 to 2 weeks.  Lipids will be reassessed in 6 months.    Orders:  -     Comprehensive Metabolic Panel; Future  -     Lipid Panel; Future    3. Class 3 severe obesity with serious comorbidity and body mass index (BMI) of 45.0 to 49.9 in adult, unspecified obesity type  Assessment & Plan:  Patient's (Body mass index is 45.56 kg/m².) indicates that they are morbidly/severely obese (BMI > 40 or > 35 with obesity - related health condition) with health conditions that include hypertension and dyslipidemias . Weight is worsening. BMI  is above average; BMI  management plan is completed. We discussed low calorie, low carb based diet program, portion control, increasing exercise, and Information on healthy weight added to patient's after visit summary.     Orders:  -     Vitamin D,25-Hydroxy; Future    4. Acute non-recurrent sinusitis, unspecified location  -     Chlorcyclizine-Pseudoephed (Stahist AD) 25-60 MG tablet; Take 1 tablet by mouth Every 4 (Four) Hours As Needed (congestion).  Dispense: 16 tablet; Refill: 0    I will have her start Stahist as needed for congestion, samples given in office today.    Follow Up   Return in about 3 months (around 2/27/2024) for Next scheduled follow up HTN, Fasting Labs in 1-2 weeks.  Patient was given instructions and counseling regarding her condition or for health maintenance advice. Please see specific information pulled into the AVS if appropriate.     Parts of this note are electronic transcriptions/translations of spoken language to printed text using the Dragon Dictation system.      Macarena Carter, APRN  11/27/2023

## 2023-11-27 NOTE — ASSESSMENT & PLAN NOTE
Patient's (Body mass index is 45.56 kg/m².) indicates that they are morbidly/severely obese (BMI > 40 or > 35 with obesity - related health condition) with health conditions that include hypertension and dyslipidemias . Weight is worsening. BMI  is above average; BMI management plan is completed. We discussed low calorie, low carb based diet program, portion control, increasing exercise, and Information on healthy weight added to patient's after visit summary.

## 2023-11-27 NOTE — ASSESSMENT & PLAN NOTE
Lipid abnormalities are improving with treatment.  Pharmacotherapy as ordered.  Patient will return for fasting labs in 1 to 2 weeks.  Lipids will be reassessed in 6 months.

## 2023-12-12 ENCOUNTER — LAB (OUTPATIENT)
Dept: FAMILY MEDICINE CLINIC | Facility: CLINIC | Age: 43
End: 2023-12-12
Payer: COMMERCIAL

## 2023-12-12 DIAGNOSIS — I10 PRIMARY HYPERTENSION: ICD-10-CM

## 2023-12-12 DIAGNOSIS — E66.01 CLASS 3 SEVERE OBESITY WITH SERIOUS COMORBIDITY AND BODY MASS INDEX (BMI) OF 45.0 TO 49.9 IN ADULT, UNSPECIFIED OBESITY TYPE: ICD-10-CM

## 2023-12-12 DIAGNOSIS — E78.2 MIXED HYPERLIPIDEMIA: Chronic | ICD-10-CM

## 2023-12-12 LAB
25(OH)D3 SERPL-MCNC: 45.3 NG/ML (ref 30–100)
ALBUMIN SERPL-MCNC: 4.1 G/DL (ref 3.5–5.2)
ALBUMIN/GLOB SERPL: 1.6 G/DL
ALP SERPL-CCNC: 41 U/L (ref 39–117)
ALT SERPL W P-5'-P-CCNC: 18 U/L (ref 1–33)
ANION GAP SERPL CALCULATED.3IONS-SCNC: 10 MMOL/L (ref 5–15)
AST SERPL-CCNC: 22 U/L (ref 1–32)
BILIRUB SERPL-MCNC: 0.3 MG/DL (ref 0–1.2)
BUN SERPL-MCNC: 23 MG/DL (ref 6–20)
BUN/CREAT SERPL: 20 (ref 7–25)
CALCIUM SPEC-SCNC: 9.3 MG/DL (ref 8.6–10.5)
CHLORIDE SERPL-SCNC: 103 MMOL/L (ref 98–107)
CHOLEST SERPL-MCNC: 199 MG/DL (ref 0–200)
CO2 SERPL-SCNC: 26 MMOL/L (ref 22–29)
CREAT SERPL-MCNC: 1.15 MG/DL (ref 0.57–1)
EGFRCR SERPLBLD CKD-EPI 2021: 60.4 ML/MIN/1.73
GLOBULIN UR ELPH-MCNC: 2.5 GM/DL
GLUCOSE SERPL-MCNC: 90 MG/DL (ref 65–99)
HDLC SERPL-MCNC: 44 MG/DL (ref 40–60)
LDLC SERPL CALC-MCNC: 124 MG/DL (ref 0–100)
LDLC/HDLC SERPL: 2.73 {RATIO}
POTASSIUM SERPL-SCNC: 4.2 MMOL/L (ref 3.5–5.2)
PROT SERPL-MCNC: 6.6 G/DL (ref 6–8.5)
SODIUM SERPL-SCNC: 139 MMOL/L (ref 136–145)
TRIGL SERPL-MCNC: 175 MG/DL (ref 0–150)
VLDLC SERPL-MCNC: 31 MG/DL (ref 5–40)

## 2023-12-12 PROCEDURE — 80061 LIPID PANEL: CPT | Performed by: NURSE PRACTITIONER

## 2023-12-12 PROCEDURE — 80053 COMPREHEN METABOLIC PANEL: CPT | Performed by: NURSE PRACTITIONER

## 2023-12-12 PROCEDURE — 82306 VITAMIN D 25 HYDROXY: CPT | Performed by: NURSE PRACTITIONER

## 2023-12-12 PROCEDURE — 36415 COLL VENOUS BLD VENIPUNCTURE: CPT | Performed by: NURSE PRACTITIONER

## 2023-12-13 DIAGNOSIS — I10 PRIMARY HYPERTENSION: Primary | ICD-10-CM

## 2023-12-13 RX ORDER — LISINOPRIL 10 MG/1
10 TABLET ORAL DAILY
Qty: 39 TABLET | Refills: 2 | Status: SHIPPED | OUTPATIENT
Start: 2023-12-13

## 2023-12-28 ENCOUNTER — TELEPHONE (OUTPATIENT)
Dept: FAMILY MEDICINE CLINIC | Facility: CLINIC | Age: 43
End: 2023-12-28
Payer: COMMERCIAL

## 2023-12-28 DIAGNOSIS — I10 PRIMARY HYPERTENSION: Primary | ICD-10-CM

## 2023-12-28 RX ORDER — CARVEDILOL 3.12 MG/1
3.12 TABLET ORAL 2 TIMES DAILY WITH MEALS
Qty: 60 TABLET | Refills: 2 | Status: SHIPPED | OUTPATIENT
Start: 2023-12-28

## 2023-12-28 NOTE — TELEPHONE ENCOUNTER
Patient sent me a message on [employer] that the medication that I started her on for her blood pressure caused her joints to ache.  She had side effects to losartan so I had changed her lisinopril.  She is still having problems with lisinopril.  She is already taking hydrochlorothiazide.  I will start her on coreg 3.125 mg twice daily with meals.

## 2024-01-20 DIAGNOSIS — R41.840 DIFFICULTY CONCENTRATING: ICD-10-CM

## 2024-01-22 RX ORDER — BUPROPION HYDROCHLORIDE 300 MG/1
300 TABLET ORAL EVERY MORNING
Qty: 30 TABLET | Refills: 0 | Status: SHIPPED | OUTPATIENT
Start: 2024-01-22

## 2024-02-14 DIAGNOSIS — E78.2 MIXED HYPERLIPIDEMIA: Chronic | ICD-10-CM

## 2024-02-14 DIAGNOSIS — F41.9 ANXIETY: ICD-10-CM

## 2024-02-14 DIAGNOSIS — I10 PRIMARY HYPERTENSION: ICD-10-CM

## 2024-02-15 RX ORDER — HYDROCHLOROTHIAZIDE 25 MG/1
25 TABLET ORAL DAILY
Qty: 90 TABLET | Refills: 1 | OUTPATIENT
Start: 2024-02-15

## 2024-02-15 RX ORDER — SIMVASTATIN 40 MG
40 TABLET ORAL NIGHTLY
Qty: 90 TABLET | Refills: 1 | OUTPATIENT
Start: 2024-02-15

## 2024-02-15 RX ORDER — CITALOPRAM 40 MG/1
40 TABLET ORAL DAILY
Qty: 90 TABLET | Refills: 1 | OUTPATIENT
Start: 2024-02-15

## 2024-02-27 ENCOUNTER — OFFICE VISIT (OUTPATIENT)
Dept: FAMILY MEDICINE CLINIC | Facility: CLINIC | Age: 44
End: 2024-02-27
Payer: COMMERCIAL

## 2024-02-27 ENCOUNTER — OFFICE VISIT (OUTPATIENT)
Dept: OBSTETRICS AND GYNECOLOGY | Facility: CLINIC | Age: 44
End: 2024-02-27
Payer: COMMERCIAL

## 2024-02-27 VITALS
DIASTOLIC BLOOD PRESSURE: 84 MMHG | BODY MASS INDEX: 46.87 KG/M2 | SYSTOLIC BLOOD PRESSURE: 142 MMHG | HEIGHT: 63 IN | WEIGHT: 264.5 LBS | HEART RATE: 76 BPM | OXYGEN SATURATION: 97 % | TEMPERATURE: 96 F

## 2024-02-27 VITALS
HEART RATE: 79 BPM | DIASTOLIC BLOOD PRESSURE: 90 MMHG | BODY MASS INDEX: 44.32 KG/M2 | HEIGHT: 65 IN | WEIGHT: 266 LBS | SYSTOLIC BLOOD PRESSURE: 145 MMHG

## 2024-02-27 DIAGNOSIS — Z72.0 TOBACCO USE: ICD-10-CM

## 2024-02-27 DIAGNOSIS — E66.01 CLASS 3 SEVERE OBESITY WITH SERIOUS COMORBIDITY AND BODY MASS INDEX (BMI) OF 45.0 TO 49.9 IN ADULT, UNSPECIFIED OBESITY TYPE: ICD-10-CM

## 2024-02-27 DIAGNOSIS — E78.2 MIXED HYPERLIPIDEMIA: Chronic | ICD-10-CM

## 2024-02-27 DIAGNOSIS — Z01.419 WOMEN'S ANNUAL ROUTINE GYNECOLOGICAL EXAMINATION: Primary | ICD-10-CM

## 2024-02-27 DIAGNOSIS — R41.840 DIFFICULTY CONCENTRATING: ICD-10-CM

## 2024-02-27 DIAGNOSIS — F41.9 ANXIETY: ICD-10-CM

## 2024-02-27 DIAGNOSIS — I10 PRIMARY HYPERTENSION: Primary | ICD-10-CM

## 2024-02-27 DIAGNOSIS — N95.1 MENOPAUSAL VASOMOTOR SYNDROME: ICD-10-CM

## 2024-02-27 DIAGNOSIS — Z12.11 SCREEN FOR COLON CANCER: ICD-10-CM

## 2024-02-27 PROBLEM — R87.622 LGSIL PAP SMEAR OF VAGINA: Status: RESOLVED | Noted: 2022-10-27 | Resolved: 2024-02-27

## 2024-02-27 LAB
ALBUMIN SERPL-MCNC: 3.9 G/DL (ref 3.5–5.2)
ALBUMIN/GLOB SERPL: 1.5 G/DL
ALP SERPL-CCNC: 45 U/L (ref 39–117)
ALT SERPL W P-5'-P-CCNC: 18 U/L (ref 1–33)
ANION GAP SERPL CALCULATED.3IONS-SCNC: 10 MMOL/L (ref 5–15)
AST SERPL-CCNC: 15 U/L (ref 1–32)
BASOPHILS # BLD AUTO: 0.12 10*3/MM3 (ref 0–0.2)
BASOPHILS NFR BLD AUTO: 1.2 % (ref 0–1.5)
BILIRUB SERPL-MCNC: 0.2 MG/DL (ref 0–1.2)
BUN SERPL-MCNC: 15 MG/DL (ref 6–20)
BUN/CREAT SERPL: 15.2 (ref 7–25)
CALCIUM SPEC-SCNC: 9.1 MG/DL (ref 8.6–10.5)
CHLORIDE SERPL-SCNC: 106 MMOL/L (ref 98–107)
CHOLEST SERPL-MCNC: 212 MG/DL (ref 0–200)
CO2 SERPL-SCNC: 24 MMOL/L (ref 22–29)
CREAT SERPL-MCNC: 0.99 MG/DL (ref 0.57–1)
DEPRECATED RDW RBC AUTO: 41.9 FL (ref 37–54)
EGFRCR SERPLBLD CKD-EPI 2021: 71.8 ML/MIN/1.73
EOSINOPHIL # BLD AUTO: 0.45 10*3/MM3 (ref 0–0.4)
EOSINOPHIL NFR BLD AUTO: 4.6 % (ref 0.3–6.2)
ERYTHROCYTE [DISTWIDTH] IN BLOOD BY AUTOMATED COUNT: 12.6 % (ref 12.3–15.4)
GLOBULIN UR ELPH-MCNC: 2.6 GM/DL
GLUCOSE SERPL-MCNC: 81 MG/DL (ref 65–99)
HCT VFR BLD AUTO: 40.3 % (ref 34–46.6)
HDLC SERPL-MCNC: 39 MG/DL (ref 40–60)
HGB BLD-MCNC: 13 G/DL (ref 12–15.9)
IMM GRANULOCYTES # BLD AUTO: 0.02 10*3/MM3 (ref 0–0.05)
IMM GRANULOCYTES NFR BLD AUTO: 0.2 % (ref 0–0.5)
LDLC SERPL CALC-MCNC: 124 MG/DL (ref 0–100)
LDLC/HDLC SERPL: 3.03 {RATIO}
LYMPHOCYTES # BLD AUTO: 2.75 10*3/MM3 (ref 0.7–3.1)
LYMPHOCYTES NFR BLD AUTO: 28 % (ref 19.6–45.3)
MCH RBC QN AUTO: 29 PG (ref 26.6–33)
MCHC RBC AUTO-ENTMCNC: 32.3 G/DL (ref 31.5–35.7)
MCV RBC AUTO: 90 FL (ref 79–97)
MONOCYTES # BLD AUTO: 0.69 10*3/MM3 (ref 0.1–0.9)
MONOCYTES NFR BLD AUTO: 7 % (ref 5–12)
NEUTROPHILS NFR BLD AUTO: 5.78 10*3/MM3 (ref 1.7–7)
NEUTROPHILS NFR BLD AUTO: 59 % (ref 42.7–76)
NRBC BLD AUTO-RTO: 0 /100 WBC (ref 0–0.2)
PLATELET # BLD AUTO: 379 10*3/MM3 (ref 140–450)
PMV BLD AUTO: 11.5 FL (ref 6–12)
POTASSIUM SERPL-SCNC: 4.1 MMOL/L (ref 3.5–5.2)
PROT SERPL-MCNC: 6.5 G/DL (ref 6–8.5)
RBC # BLD AUTO: 4.48 10*6/MM3 (ref 3.77–5.28)
SODIUM SERPL-SCNC: 140 MMOL/L (ref 136–145)
TRIGL SERPL-MCNC: 275 MG/DL (ref 0–150)
VLDLC SERPL-MCNC: 49 MG/DL (ref 5–40)
WBC NRBC COR # BLD AUTO: 9.81 10*3/MM3 (ref 3.4–10.8)

## 2024-02-27 PROCEDURE — 85025 COMPLETE CBC W/AUTO DIFF WBC: CPT | Performed by: NURSE PRACTITIONER

## 2024-02-27 PROCEDURE — 80053 COMPREHEN METABOLIC PANEL: CPT | Performed by: NURSE PRACTITIONER

## 2024-02-27 PROCEDURE — 99396 PREV VISIT EST AGE 40-64: CPT | Performed by: OBSTETRICS & GYNECOLOGY

## 2024-02-27 PROCEDURE — 80061 LIPID PANEL: CPT | Performed by: NURSE PRACTITIONER

## 2024-02-27 RX ORDER — CARVEDILOL 3.12 MG/1
3.12 TABLET ORAL 2 TIMES DAILY WITH MEALS
Qty: 60 TABLET | Refills: 2 | Status: CANCELLED | OUTPATIENT
Start: 2024-02-27

## 2024-02-27 RX ORDER — AMLODIPINE BESYLATE 5 MG/1
5 TABLET ORAL DAILY
Qty: 30 TABLET | Refills: 2 | Status: SHIPPED | OUTPATIENT
Start: 2024-02-27

## 2024-02-27 RX ORDER — FENOFIBRATE 160 MG/1
160 TABLET ORAL NIGHTLY
Qty: 90 TABLET | Refills: 1 | Status: SHIPPED | OUTPATIENT
Start: 2024-02-27

## 2024-02-27 RX ORDER — BUPROPION HYDROCHLORIDE 300 MG/1
300 TABLET ORAL EVERY MORNING
Qty: 90 TABLET | Refills: 1 | Status: SHIPPED | OUTPATIENT
Start: 2024-02-27

## 2024-02-27 RX ORDER — SIMVASTATIN 40 MG
40 TABLET ORAL NIGHTLY
Qty: 90 TABLET | Refills: 1 | Status: SHIPPED | OUTPATIENT
Start: 2024-02-27

## 2024-02-27 RX ORDER — CITALOPRAM 40 MG/1
40 TABLET ORAL DAILY
Qty: 90 TABLET | Refills: 1 | Status: SHIPPED | OUTPATIENT
Start: 2024-02-27

## 2024-02-27 RX ORDER — HYDROCHLOROTHIAZIDE 25 MG/1
25 TABLET ORAL DAILY
Qty: 90 TABLET | Refills: 1 | Status: SHIPPED | OUTPATIENT
Start: 2024-02-27

## 2024-02-27 RX ORDER — ESTRADIOL 2 MG/1
2 TABLET ORAL DAILY
Qty: 90 TABLET | Refills: 3 | Status: SHIPPED | OUTPATIENT
Start: 2024-02-27

## 2024-02-27 RX ORDER — SIMVASTATIN 40 MG
40 TABLET ORAL NIGHTLY
Qty: 90 TABLET | Refills: 1 | Status: CANCELLED | OUTPATIENT
Start: 2024-02-27

## 2024-02-27 NOTE — PROGRESS NOTES
"Well Woman Visit    CC: PHYLICIA     HPI:   44 y.o.  who presents for a well woman exam.  The patient is status post total laparoscopic hysterectomy with BSO for pelvic pain as well as recurrent abnormal Pap smears.  This was about 9 years ago.  She is taking hormone replacement therapy and doing well.  She reports no significant vasomotor symptoms.  No other problems or medication.  She wishes to continue on the estradiol 2 mg daily.    History: PMHx, Meds, Allergies, PSHx, Social Hx, and POBHx all reviewed and updated.    /90   Pulse 79   Ht 165.1 cm (65\")   Wt 121 kg (266 lb)   Breastfeeding No   BMI 44.26 kg/m²     Physical Exam  Vitals and nursing note reviewed. Exam conducted with a chaperone present.   Constitutional:       General: She is not in acute distress.     Appearance: Normal appearance. She is obese. She is not ill-appearing.   HENT:      Head: Normocephalic and atraumatic.      Mouth/Throat:      Mouth: Mucous membranes are moist.      Pharynx: Oropharynx is clear.   Neck:      Thyroid: No thyroid mass or thyromegaly.   Chest:   Breasts:     Breasts are symmetrical.      Right: Normal. No swelling, bleeding, inverted nipple, mass, nipple discharge, skin change or tenderness.      Left: Normal. No swelling, bleeding, inverted nipple, mass, nipple discharge, skin change or tenderness.   Abdominal:      General: Abdomen is flat. There is no distension.      Palpations: Abdomen is soft. There is no mass.      Tenderness: There is no abdominal tenderness. There is no rebound.      Hernia: There is no hernia in the left inguinal area or right inguinal area.   Genitourinary:     General: Normal vulva.      Exam position: Lithotomy position.      Pubic Area: No rash.       Labia:         Right: No rash, tenderness, lesion or injury.         Left: No rash, tenderness, lesion or injury.       Urethra: No urethral pain or urethral lesion.      Vagina: Normal. No vaginal discharge, erythema, " tenderness, bleeding, lesions or prolapsed vaginal walls.      Comments: The vaginal cuff appears normal.  There are no palpable adnexal masses.  The uterus, cervix and both ovaries are surgically absent  Musculoskeletal:         General: No swelling.      Right lower leg: No edema.      Left lower leg: No edema.   Lymphadenopathy:      Upper Body:      Right upper body: No supraclavicular or axillary adenopathy.      Left upper body: No supraclavicular or axillary adenopathy.   Skin:     General: Skin is warm and dry.      Findings: No rash.   Neurological:      Mental Status: She is alert and oriented to person, place, and time.   Psychiatric:         Mood and Affect: Mood normal.         Behavior: Behavior normal.         Thought Content: Thought content normal.         ASSESSMENT AND PLAN:    Diagnoses and all orders for this visit:    1. Women's annual routine gynecological examination (Primary)  Assessment & Plan:  Pap  Mammogram  Recommend daily calcium and vitamin D    Orders:  -     IGP,rfx Aptima HPV All Pth  -     Mammo Screening Digital Tomosynthesis Bilateral With CAD; Future    2. Menopausal vasomotor syndrome  Assessment & Plan:  Continue estradiol 2 mg oral daily.  The risk, benefits, alternatives were discussed including the risks of VTE.    Orders:  -     estradiol (Estrace) 2 MG tablet; Take 1 tablet by mouth Daily.  Dispense: 90 tablet; Refill: 3    3. Tobacco use        Counseling:     HRT R/B/A/SE/E all options including non-FDA approved options reviewed    Preventative:   MMG  s/p FLU vaccine this season  s/p COVID vaccine    She understands the importance of having any ordered tests to be performed in a timely fashion.  The risks of not performing them include, but are not limited to, advanced cancer stages, bone loss from osteoporosis and/or subsequent increase in morbidity and/or mortality.  She is encouraged to review her results online and/or contact or office if she has questions.      Follow Up:  Return for Annual physical.    Reji Nuñez MD  02/27/2024

## 2024-02-27 NOTE — ASSESSMENT & PLAN NOTE
Lipid abnormalities are stable    Plan:  Continue same medication/s without change.      Discussed medication dosage, use, side effects, and goals of treatment in detail.    Counseled patient on lifestyle modifications to help control hyperlipidemia.   Weight Loss encouraged  I discussed with her the potential interaction with amlodipine and simvastatin can cause increase side effects of simvastatin.  Advised to watch for any adverse side effects and to notify me if she has any adverse side effects.    Patient Treatment Goals:   LDL goal is less than 70  LDL goal is under 100    Followup in 3 months.

## 2024-02-27 NOTE — ASSESSMENT & PLAN NOTE
Patient's (Body mass index is 46.85 kg/m².) indicates that they are morbidly/severely obese (BMI > 40 or > 35 with obesity - related health condition) with health conditions that include hypertension and dyslipidemias . Weight is worsening. BMI  is above average; BMI management plan is completed. We discussed low calorie, low carb based diet program, portion control, increasing exercise, and Information on healthy weight added to patient's after visit summary.

## 2024-02-27 NOTE — ASSESSMENT & PLAN NOTE
Continue estradiol 2 mg oral daily.  The risk, benefits, alternatives were discussed including the risks of VTE.

## 2024-02-27 NOTE — PROGRESS NOTES
"Chief Complaint  Hypertension, Anxiety, and Hyperlipidemia    Subjective          Natalyarodrigo Bynum, 44 y.o. female presents to [facility]  History of Present Illness   for follow up on hypertension and anxiety.    Hypertension: she was started on Coreg 3.125 mg 2x/day and is also on hydrochlorothiazide 25 mg daily.  She   States her home blood pressures have been about averaging about the same.  She is wanting to try amlodipine because someone in her family is on this medication.    Body mass index is 46.85 kg/m².  She has been tested for insulin resistance and diabetes and negative for both. She states she has not been exercising lately because her grandmother has been in the hospital.  Her insurance would not cover the Wegovy weight loss medication.  She is not a candidate for phentermine due to uncontrolled hypertension.     Hyperlipidemia she is currently on simvastatin 40 mg and fenofibrate 160 mg every evening.  She states she is tolerating these medicines well, denies any myalgia.     Anxiety: She is doing well on citalopram 40 mg daily and she is on Wellbutrin  mg daily to help with difficulty with concentration.  She states she is doing well on both these medications.    PHQ-2 Depression Screening  Little interest or pleasure in doing things? 0-->not at all   Feeling down, depressed, or hopeless? 0-->not at all   PHQ-2 Total Score 0        Tobacco Use: Medium Risk (2/27/2024)    Patient History     Smoking Tobacco Use: Former     Smokeless Tobacco Use: Never     Passive Exposure: Not on file      Objective   Vital Signs:   /84   Pulse 76   Temp 96 °F (35.6 °C)   Ht 160 cm (63\")   Wt 120 kg (264 lb 8 oz)   SpO2 97%   BMI 46.85 kg/m²       Current Outpatient Medications:     buPROPion XL (WELLBUTRIN XL) 300 MG 24 hr tablet, Take 1 tablet by mouth Every Morning. Indications: difficulty concentrating, Disp: 90 tablet, Rfl: 1    citalopram (CeleXA) 40 MG " tablet, Take 1 tablet by mouth Daily. Indications: Generalized Anxiety Disorder, Disp: 90 tablet, Rfl: 1    estradiol (Estrace) 2 MG tablet, Take 1 tablet by mouth Daily., Disp: 90 tablet, Rfl: 3    fenofibrate 160 MG tablet, Take 1 tablet by mouth Every Night. Indications: Elevation of Both Cholesterol and Triglycerides in Blood, Disp: 90 tablet, Rfl: 1    hydroCHLOROthiazide 25 MG tablet, Take 1 tablet by mouth Daily. Indications: High Blood Pressure Disorder, Disp: 90 tablet, Rfl: 1    simvastatin (ZOCOR) 40 MG tablet, Take 1 tablet by mouth Every Night. Indications: High Amount of Fats in the Blood, Disp: 90 tablet, Rfl: 1    amLODIPine (NORVASC) 5 MG tablet, Take 1 tablet by mouth Daily. Indications: High Blood Pressure Disorder, Disp: 30 tablet, Rfl: 2   Past Medical History:   Diagnosis Date    Abnormal Pap smear of cervix     Cervical dysplasia     H. pylori infection     HPV (human papilloma virus) infection     Hyperlipidemia     Hypertension 2023    LGSIL Pap smear of vagina     Migraine       Physical Exam  Vitals reviewed.   Constitutional:       Appearance: Normal appearance. She is well-developed. She is morbidly obese.   Neck:      Thyroid: No thyroid mass, thyromegaly or thyroid tenderness.   Cardiovascular:      Rate and Rhythm: Normal rate and regular rhythm.      Heart sounds: No murmur heard.     No friction rub. No gallop.   Pulmonary:      Effort: Pulmonary effort is normal.      Breath sounds: Normal breath sounds. No wheezing or rhonchi.   Lymphadenopathy:      Cervical: No cervical adenopathy.   Skin:     General: Skin is warm and dry.   Neurological:      Mental Status: She is alert and oriented to person, place, and time.      Cranial Nerves: No cranial nerve deficit.   Psychiatric:         Mood and Affect: Mood and affect normal.         Behavior: Behavior normal.         Thought Content: Thought content normal. Thought content does not include homicidal or suicidal ideation.          Judgment: Judgment normal.        Result Review :   {The following data was reviewed by SERA Read      Common Labs   Common labs          4/25/2023    07:29 12/12/2023    07:26   Common Labs   Glucose 83  90    BUN 15  23    Creatinine 1.21  1.15    Sodium 139  139    Potassium 4.5  4.2    Chloride 107  103    Calcium 9.4  9.3    Albumin 4.0  4.1    Total Bilirubin <0.2  0.3    Alkaline Phosphatase 43  41    AST (SGOT) 17  22    ALT (SGPT) 17  18    WBC 10.89     Hemoglobin 12.8     Hematocrit 37.8     Platelets 357     Total Cholesterol 169  199    Triglycerides 121  175    HDL Cholesterol 46  44    LDL Cholesterol  101  124    Hemoglobin A1C 5.10       TSH   TSH          4/25/2023    07:29   TSH   TSH 3.400      VITD   Lab Results   Component Value Date    [id number] 45.3 12/12/2023              Assessment and Plan    Diagnoses and all orders for this visit:    1. Primary hypertension (Primary)  Assessment & Plan:  Hypertension is borderline  Medication changes per orders.  Weight loss.  Regular aerobic exercise.  Ambulatory blood pressure monitoring.  I will have her stop Coreg since it did not seem to help and I will start her on amlodipine 5 mg daily.  She will continue hydrochlorothiazide 25 mg daily.  Advised her to watch for any lower extremity swelling.  Blood pressure will be reassessedin 3 months.    Orders:  -     hydroCHLOROthiazide 25 MG tablet; Take 1 tablet by mouth Daily. Indications: High Blood Pressure Disorder  Dispense: 90 tablet; Refill: 1  -     amLODIPine (NORVASC) 5 MG tablet; Take 1 tablet by mouth Daily. Indications: High Blood Pressure Disorder  Dispense: 30 tablet; Refill: 2  -     CBC Auto Differential  -     Comprehensive Metabolic Panel  -     Lipid Panel    2. Anxiety  Assessment & Plan:  Anxiety is well-controlled on citalopram, she will continue 40 mg daily.    Orders:  -     citalopram (CeleXA) 40 MG tablet; Take 1 tablet by mouth Daily. Indications: Generalized  Anxiety Disorder  Dispense: 90 tablet; Refill: 1    3. Difficulty concentrating  Assessment & Plan:  She is doing well on Wellbutrin, will continue 300 mg every morning.    Orders:  -     buPROPion XL (WELLBUTRIN XL) 300 MG 24 hr tablet; Take 1 tablet by mouth Every Morning. Indications: difficulty concentrating  Dispense: 90 tablet; Refill: 1    4. Mixed hyperlipidemia  Assessment & Plan:   Lipid abnormalities are stable    Plan:  Continue same medication/s without change.      Discussed medication dosage, use, side effects, and goals of treatment in detail.    Counseled patient on lifestyle modifications to help control hyperlipidemia.   Weight Loss encouraged  I discussed with her the potential interaction with amlodipine and simvastatin can cause increase side effects of simvastatin.  Advised to watch for any adverse side effects and to notify me if she has any adverse side effects.    Patient Treatment Goals:   LDL goal is less than 70  LDL goal is under 100    Followup in 3 months.    Orders:  -     fenofibrate 160 MG tablet; Take 1 tablet by mouth Every Night. Indications: Elevation of Both Cholesterol and Triglycerides in Blood  Dispense: 90 tablet; Refill: 1  -     Comprehensive Metabolic Panel  -     Lipid Panel  -     simvastatin (ZOCOR) 40 MG tablet; Take 1 tablet by mouth Every Night. Indications: High Amount of Fats in the Blood  Dispense: 90 tablet; Refill: 1    5. Class 3 severe obesity with serious comorbidity and body mass index (BMI) of 45.0 to 49.9 in adult, unspecified obesity type  Assessment & Plan:  Patient's (Body mass index is 46.85 kg/m².) indicates that they are morbidly/severely obese (BMI > 40 or > 35 with obesity - related health condition) with health conditions that include hypertension and dyslipidemias . Weight is worsening. BMI  is above average; BMI management plan is completed. We discussed low calorie, low carb based diet program, portion control, increasing exercise, and  Information on healthy weight added to patient's after visit summary.       6. Screen for colon cancer  -     Cologuard - Stool, Per Rectum; Future        Follow Up   Return in about 3 months (around 5/27/2024) for Annual physical.  Patient was given instructions and counseling regarding her condition or for health maintenance advice. Please see specific information pulled into the AVS if appropriate.     Parts of this note are electronic transcriptions/translations of spoken language to printed text using the Dragon Dictation system.      Macarena Carter, SERA  02/27/2024

## 2024-02-27 NOTE — ASSESSMENT & PLAN NOTE
Hypertension is borderline  Medication changes per orders.  Weight loss.  Regular aerobic exercise.  Ambulatory blood pressure monitoring.  I will have her stop Coreg since it did not seem to help and I will start her on amlodipine 5 mg daily.  She will continue hydrochlorothiazide 25 mg daily.  Advised her to watch for any lower extremity swelling.  Blood pressure will be reassessedin 3 months.

## 2024-03-04 ENCOUNTER — PATIENT ROUNDING (BHMG ONLY) (OUTPATIENT)
Dept: FAMILY MEDICINE CLINIC | Facility: CLINIC | Age: 44
End: 2024-03-04
Payer: COMMERCIAL

## 2024-03-05 LAB
CONV .: ABNORMAL
CYTOLOGIST CVX/VAG CYTO: ABNORMAL
CYTOLOGY CVX/VAG DOC CYTO: ABNORMAL
CYTOLOGY CVX/VAG DOC THIN PREP: ABNORMAL
DX ICD CODE: ABNORMAL
DX ICD CODE: ABNORMAL
HPV I/H RISK 4 DNA CVX QL PROBE+SIG AMP: NEGATIVE
Lab: ABNORMAL
OTHER STN SPEC: ABNORMAL
PATHOLOGIST CVX/VAG CYTO: ABNORMAL
RECOM F/U CVX/VAG CYTO: ABNORMAL
STAT OF ADQ CVX/VAG CYTO-IMP: ABNORMAL

## 2024-03-06 ENCOUNTER — TELEPHONE (OUTPATIENT)
Dept: OBSTETRICS AND GYNECOLOGY | Facility: CLINIC | Age: 44
End: 2024-03-06
Payer: COMMERCIAL

## 2024-03-06 NOTE — TELEPHONE ENCOUNTER
----- Message from Reji Nuñez MD sent at 3/5/2024  8:22 AM EST -----  Please notify the patient of her Pap smear result.  The result is atypical cells of undetermined significance, however her HPV testing is negative.  Because of the negative HPV testing this is treated as a normal.  No further follow-up is needed.  I recommend she continue on with yearly Pap smears.

## 2024-03-07 ENCOUNTER — OFFICE VISIT (OUTPATIENT)
Dept: FAMILY MEDICINE CLINIC | Facility: CLINIC | Age: 44
End: 2024-03-07
Payer: COMMERCIAL

## 2024-03-07 VITALS
OXYGEN SATURATION: 98 % | HEIGHT: 65 IN | WEIGHT: 266 LBS | SYSTOLIC BLOOD PRESSURE: 138 MMHG | HEART RATE: 84 BPM | TEMPERATURE: 97.4 F | BODY MASS INDEX: 44.32 KG/M2 | DIASTOLIC BLOOD PRESSURE: 82 MMHG

## 2024-03-07 DIAGNOSIS — U07.1 COVID-19 VIRUS INFECTION: Primary | ICD-10-CM

## 2024-03-07 DIAGNOSIS — R09.81 NASAL CONGESTION: ICD-10-CM

## 2024-03-07 LAB
EXPIRATION DATE: ABNORMAL
FLUAV AG UPPER RESP QL IA.RAPID: NOT DETECTED
FLUBV AG UPPER RESP QL IA.RAPID: NOT DETECTED
INTERNAL CONTROL: ABNORMAL
Lab: ABNORMAL
SARS-COV-2 AG UPPER RESP QL IA.RAPID: DETECTED

## 2024-03-07 PROCEDURE — 87428 SARSCOV & INF VIR A&B AG IA: CPT | Performed by: NURSE PRACTITIONER

## 2024-03-07 PROCEDURE — 99213 OFFICE O/P EST LOW 20 MIN: CPT | Performed by: NURSE PRACTITIONER

## 2024-03-07 NOTE — PROGRESS NOTES
"Chief Complaint  Generalized Body Aches, Cough, Nasal Congestion, and Headache (And sinus pressure )    Subjective          Natalya MANE Bynum, 44 y.o. female presents to [facility]  History of Present Illness   She is here today for an acute visit.  She is complaining of generalized bodyaches, cough, nasal congestion and headache.  She also has sinus pressure and ear pressure with sore throat.  She states her symptoms started 2 nights ago with but became worse yesterday and this morning.  She does have some nausea but denies any vomiting or diarrhea.  She states she does have decreased appetite.    In office COVID swab is positive, flu swab is negative.       Tobacco Use: Medium Risk (3/7/2024)    Patient History     Smoking Tobacco Use: Former     Smokeless Tobacco Use: Never     Passive Exposure: Not on file      Objective   Vital Signs:   /82 (BP Location: Left arm, Patient Position: Sitting, Cuff Size: Adult)   Pulse 84   Temp 97.4 °F (36.3 °C)   Ht 165.1 cm (65\")   Wt 121 kg (266 lb)   SpO2 98%   BMI 44.26 kg/m²       Current Outpatient Medications:     amLODIPine (NORVASC) 5 MG tablet, Take 1 tablet by mouth Daily. Indications: High Blood Pressure Disorder, Disp: 30 tablet, Rfl: 2    buPROPion XL (WELLBUTRIN XL) 300 MG 24 hr tablet, Take 1 tablet by mouth Every Morning. Indications: difficulty concentrating, Disp: 90 tablet, Rfl: 1    citalopram (CeleXA) 40 MG tablet, Take 1 tablet by mouth Daily. Indications: Generalized Anxiety Disorder, Disp: 90 tablet, Rfl: 1    estradiol (Estrace) 2 MG tablet, Take 1 tablet by mouth Daily., Disp: 90 tablet, Rfl: 3    fenofibrate 160 MG tablet, Take 1 tablet by mouth Every Night. Indications: Elevation of Both Cholesterol and Triglycerides in Blood, Disp: 90 tablet, Rfl: 1    hydroCHLOROthiazide 25 MG tablet, Take 1 tablet by mouth Daily. Indications: High Blood Pressure Disorder, Disp: 90 tablet, Rfl: 1    simvastatin (ZOCOR) " 40 MG tablet, Take 1 tablet by mouth Every Night. Indications: High Amount of Fats in the Blood, Disp: 90 tablet, Rfl: 1    Nirmatrelvir & Ritonavir, 300mg/100mg, (PAXLOVID), Take 3 tablets by mouth 2 (Two) Times a Day for 5 days., Disp: 30 tablet, Rfl: 0   Past Medical History:   Diagnosis Date    Abnormal Pap smear of cervix     Cervical dysplasia     H. pylori infection     HPV (human papilloma virus) infection     Hyperlipidemia     Hypertension 2023    LGSIL Pap smear of vagina     Migraine       Physical Exam  Vitals reviewed.   Constitutional:       Appearance: Normal appearance. She is well-developed. She is morbidly obese.   HENT:      Right Ear: Tympanic membrane, ear canal and external ear normal.      Left Ear: Tympanic membrane, ear canal and external ear normal.      Mouth/Throat:      Mouth: Mucous membranes are moist.      Pharynx: No pharyngeal swelling, oropharyngeal exudate or posterior oropharyngeal erythema.      Comments: Postnasal drainage noted.    Neck:      Thyroid: No thyroid mass, thyromegaly or thyroid tenderness.   Cardiovascular:      Rate and Rhythm: Normal rate and regular rhythm.      Heart sounds: No murmur heard.     No friction rub. No gallop.   Pulmonary:      Effort: Pulmonary effort is normal.      Breath sounds: Normal breath sounds. No wheezing or rhonchi.   Lymphadenopathy:      Cervical: No cervical adenopathy.   Skin:     General: Skin is warm and dry.   Neurological:      Mental Status: She is alert and oriented to person, place, and time.      Cranial Nerves: No cranial nerve deficit.   Psychiatric:         Mood and Affect: Mood and affect normal.         Behavior: Behavior normal.         Thought Content: Thought content normal. Thought content does not include homicidal or suicidal ideation.         Judgment: Judgment normal.        Result Review :   {The following data was reviewed by SERA Read    POC COVID/FLU   Lab Results   Component Value Date     SARSANTIGEN Detected (A) 03/07/2024    FLUAAG Not Detected 03/07/2024    FLUBAG Not Detected 03/07/2024    INTCT Passed 03/07/2024    LOTNUMBER [id number] 03/07/2024    EXPIRATDTE 1/16/25 03/07/2024               Assessment and Plan    Diagnoses and all orders for this visit:    1. COVID-19 virus infection (Primary)  -     Nirmatrelvir & Ritonavir, 300mg/100mg, (PAXLOVID); Take 3 tablets by mouth 2 (Two) Times a Day for 5 days.  Dispense: 30 tablet; Refill: 0    2. Nasal congestion  -     POCT SARS-CoV-2 Antigen POOJA + Flu    Discussed with patient to quarantine for at least 5 days from onset of symptoms and symptoms have resolved.  Patient instructed not to go anywhere except to seek medical care.  Advised to wear a mask if patient goes anywhere for the next 5 days.  Instructed to seek medical care for any difficulty of breathing, unable to keep fluids down, or worsening of symptoms.  We discussed starting Paxlovid and she is agreeable.  Discussed with patient upper respiratory infection is viral and antibiotics are not warranted.  Discussed symptom management, take Tylenol or ibuprofen as needed, drink lots of fluids and rest.      Follow Up   Return if symptoms worsen or fail to improve.  Patient was given instructions and counseling regarding her condition or for health maintenance advice. Please see specific information pulled into the AVS if appropriate.     Parts of this note are electronic transcriptions/translations of spoken language to printed text using the Dragon Dictation system.      Macarena Carter, SERA  03/07/2024

## 2024-03-07 NOTE — LETTER
March 7, 2024     Patient: Natalya Bynum   YOB: 1980   Date of Visit: 3/7/2024       To Whom It May Concern:    It is my medical opinion that Natalya Bynum may return to work in person on 2/18/2024.         Sincerely,        SERA Read    CC: No Recipients

## 2024-05-07 ENCOUNTER — HOSPITAL ENCOUNTER (OUTPATIENT)
Dept: MAMMOGRAPHY | Facility: HOSPITAL | Age: 44
Discharge: HOME | End: 2024-05-07
Admitting: OBSTETRICS & GYNECOLOGY
Payer: COMMERCIAL

## 2024-05-07 DIAGNOSIS — Z01.419 WOMEN'S ANNUAL ROUTINE GYNECOLOGICAL EXAMINATION: ICD-10-CM

## 2024-05-07 PROCEDURE — 77067 SCR MAMMO BI INCL CAD: CPT

## 2024-05-07 PROCEDURE — 77063 BREAST TOMOSYNTHESIS BI: CPT

## 2024-05-12 DIAGNOSIS — R92.8 ABNORMAL MAMMOGRAM: Primary | ICD-10-CM

## 2024-05-14 ENCOUNTER — TELEPHONE (OUTPATIENT)
Dept: OBSTETRICS AND GYNECOLOGY | Facility: CLINIC | Age: 44
End: 2024-05-14
Payer: COMMERCIAL

## 2024-05-14 DIAGNOSIS — R92.8 ABNORMAL MAMMOGRAM: Primary | ICD-10-CM

## 2024-05-15 NOTE — TELEPHONE ENCOUNTER
Patient aware of her results and recommendations. Please sign the attached order for imaging to be scheduled and notify once signed. I will schedule the patient and call her with the appointment information.

## 2024-05-25 DIAGNOSIS — I10 PRIMARY HYPERTENSION: ICD-10-CM

## 2024-05-28 RX ORDER — AMLODIPINE BESYLATE 5 MG/1
5 TABLET ORAL DAILY
Qty: 90 TABLET | Refills: 0 | Status: SHIPPED | OUTPATIENT
Start: 2024-05-28
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
## 2024-05-30 ENCOUNTER — OFFICE VISIT (OUTPATIENT)
Dept: FAMILY MEDICINE CLINIC | Facility: CLINIC | Age: 44
End: 2024-05-30
Payer: COMMERCIAL

## 2024-05-30 VITALS
OXYGEN SATURATION: 98 % | HEART RATE: 69 BPM | HEIGHT: 65 IN | TEMPERATURE: 97.5 F | DIASTOLIC BLOOD PRESSURE: 86 MMHG | WEIGHT: 267.4 LBS | BODY MASS INDEX: 44.55 KG/M2 | SYSTOLIC BLOOD PRESSURE: 138 MMHG

## 2024-05-30 DIAGNOSIS — F41.1 GENERALIZED ANXIETY DISORDER: ICD-10-CM

## 2024-05-30 DIAGNOSIS — E66.01 CLASS 3 SEVERE OBESITY WITH SERIOUS COMORBIDITY AND BODY MASS INDEX (BMI) OF 40.0 TO 44.9 IN ADULT, UNSPECIFIED OBESITY TYPE: ICD-10-CM

## 2024-05-30 DIAGNOSIS — Z00.00 ANNUAL PHYSICAL EXAM: Primary | ICD-10-CM

## 2024-05-30 DIAGNOSIS — I10 PRIMARY HYPERTENSION: ICD-10-CM

## 2024-05-30 DIAGNOSIS — E78.2 MIXED HYPERLIPIDEMIA: Chronic | ICD-10-CM

## 2024-05-30 DIAGNOSIS — Z51.81 MEDICATION MONITORING ENCOUNTER: ICD-10-CM

## 2024-05-30 PROCEDURE — 99396 PREV VISIT EST AGE 40-64: CPT | Performed by: NURSE PRACTITIONER

## 2024-05-30 PROCEDURE — 99214 OFFICE O/P EST MOD 30 MIN: CPT | Performed by: NURSE PRACTITIONER

## 2024-05-30 RX ORDER — PHENTERMINE HYDROCHLORIDE 30 MG/1
30 CAPSULE ORAL EVERY MORNING
Qty: 30 CAPSULE | Refills: 0 | Status: SHIPPED | OUTPATIENT
Start: 2024-05-30

## 2024-05-30 RX ORDER — TRAZODONE HYDROCHLORIDE 50 MG/1
50 TABLET ORAL NIGHTLY PRN
Qty: 30 TABLET | Refills: 2 | Status: SHIPPED | OUTPATIENT
Start: 2024-05-30

## 2024-05-30 NOTE — PROGRESS NOTES
Chief Complaint  Annual Exam, Hypertension, and Hyperlipidemia    Subjective            Natalya Bynum is a 44 y.o. female who presents to [facility]   History of Present Illness  3-month follow-up and annual physical.    Hypertension: I started her on amlodipine 5 mg daily and she is on hydrochlorothiazide 25 mg daily.     Body mass index is 44.5 kg/m².  She has been tested for insulin resistance and diabetes and negative for both.  Her insurance would not cover the Wegovy weight loss medication.  She is wanting to know if she can try phentermine because now her blood pressure is controlled.  She states she is getting back into going to the gym again.    Hyperlipidemia she is currently on simvastatin 40 mg and fenofibrate 160 mg every evening.  She states she is tolerating these medicines well, denies any myalgia.     Anxiety: She is doing well on citalopram 40 mg daily and she is on Wellbutrin  mg daily to help with difficulty with concentration.  She states she is doing well on both these medications.    Discussed and reviewed   Alcohol Misuse Screening and Counseling, does not drink.   Colorectal Cancer Screening, Colonoscopy verses Cologuard, cologuard was ordered but she states her insurance will not pay for it.   Counseling to Prevent Tobacco Use Natalya Bynum  reports that she quit smoking about 17 months ago. Her smoking use included cigarettes. She started smoking about 13 years ago. She has a 3 pack-year smoking history. She has never used smokeless tobacco.     Diabetes Screening-Lab Order for either glucose or A1c  Glaucoma screening, recommend routine vision exams.  Recommend routine biannual dental exams.  Hepatitis C Virus Screening (beneficiaries must fall into one of the following categories to be eligible- high risk for HCV infection, born between [birth year], or history of blood transfusion before 1992)  Human Immunodeficiency Virus (HIV) Screening,  "declines  Screening for Sexually Transmitted Infections (STIs), declines  Screening Mammography, ordered by GYN  Screening Pelvic Examinations/Pap tests (Includes a clinical breast examination), follows with GYN   AMB IMMUNIZATIONS: up-to-date    Body mass index is 44.5 kg/m².      Tobacco Use: Medium Risk (5/30/2024)    Patient History     Smoking Tobacco Use: Former     Smokeless Tobacco Use: Never     Passive Exposure: Not on file      E-cigarette/Vaping    E-cigarette/Vaping Use Never User      E-cigarette/Vaping Substances     E-cigarette/Vaping Devices       Alcohol Use: Not on file         Objective   Vital Signs:   Vitals:    05/30/24 0812 05/30/24 0818   BP: 142/91 138/86   BP Location: Left arm    Patient Position: Sitting    Cuff Size: Adult    Pulse: 69    Temp: 97.5 °F (36.4 °C)    SpO2: 98%    Weight: 121 kg (267 lb 6.4 oz)    Height: 165.1 cm (65\")    PainSc: 0-No pain      Body mass index is 44.5 kg/m².    Wt Readings from Last 3 Encounters:   05/30/24 121 kg (267 lb 6.4 oz)   03/07/24 121 kg (266 lb)   02/27/24 121 kg (266 lb)     BP Readings from Last 3 Encounters:   05/30/24 138/86   03/07/24 138/82   02/27/24 145/90       Health Maintenance   Topic Date Due    COLORECTAL CANCER SCREENING  Never done    HEPATITIS C SCREENING  11/27/2024 (Originally 11/9/2021)    ANNUAL PHYSICAL  07/18/2024    INFLUENZA VACCINE  08/01/2024    LIPID PANEL  02/27/2025    BMI FOLLOWUP  05/30/2025    MAMMOGRAM  05/07/2026    TDAP/TD VACCINES (2 - Td or Tdap) 07/18/2033    COVID-19 Vaccine  Completed    Pneumococcal Vaccine 0-64  Aged Out       /86   Pulse 69   Temp 97.5 °F (36.4 °C)   Ht 165.1 cm (65\")   Wt 121 kg (267 lb 6.4 oz)   SpO2 98%   BMI 44.50 kg/m²       Current Outpatient Medications:     amLODIPine (NORVASC) 5 MG tablet, TAKE 1 TABLET BY MOUTH DAILY, Disp: 90 tablet, Rfl: 0    buPROPion XL (WELLBUTRIN XL) 300 MG 24 hr tablet, Take 1 tablet by mouth Every Morning. Indications: difficulty " concentrating, Disp: 90 tablet, Rfl: 1    citalopram (CeleXA) 40 MG tablet, Take 1 tablet by mouth Daily. Indications: Generalized Anxiety Disorder, Disp: 90 tablet, Rfl: 1    estradiol (Estrace) 2 MG tablet, Take 1 tablet by mouth Daily., Disp: 90 tablet, Rfl: 3    fenofibrate 160 MG tablet, Take 1 tablet by mouth Every Night. Indications: Elevation of Both Cholesterol and Triglycerides in Blood, Disp: 90 tablet, Rfl: 1    hydroCHLOROthiazide 25 MG tablet, Take 1 tablet by mouth Daily. Indications: High Blood Pressure Disorder, Disp: 90 tablet, Rfl: 1    simvastatin (ZOCOR) 40 MG tablet, Take 1 tablet by mouth Every Night. Indications: High Amount of Fats in the Blood, Disp: 90 tablet, Rfl: 1    traZODone (DESYREL) 50 MG tablet, Take 1 tablet by mouth At Night As Needed for Sleep. Indications: Trouble Sleeping, Disp: 30 tablet, Rfl: 2    phentermine 30 MG capsule, Take 1 capsule by mouth Every Morning. Indications: OBESITY, Disp: 30 capsule, Rfl: 0   Past Medical History:   Diagnosis Date    Abnormal Pap smear of cervix     Cervical dysplasia     H. pylori infection     HPV (human papilloma virus) infection     Hyperlipidemia     Hypertension 2023    LGSIL Pap smear of vagina     Migraine         Physical Exam  Vitals reviewed.   Constitutional:       Appearance: Normal appearance. She is well-developed. She is morbidly obese.   Neck:      Thyroid: No thyroid mass, thyromegaly or thyroid tenderness.   Cardiovascular:      Rate and Rhythm: Normal rate and regular rhythm.      Heart sounds: No murmur heard.     No friction rub. No gallop.   Pulmonary:      Effort: Pulmonary effort is normal.      Breath sounds: Normal breath sounds. No wheezing or rhonchi.   Lymphadenopathy:      Cervical: No cervical adenopathy.   Skin:     General: Skin is warm and dry.   Neurological:      Mental Status: She is alert and oriented to person, place, and time.      Cranial Nerves: No cranial nerve deficit.   Psychiatric:         Mood  and Affect: Mood and affect normal.         Behavior: Behavior normal.         Thought Content: Thought content normal. Thought content does not include homicidal or suicidal ideation.         Judgment: Judgment normal.          Result Review :    The following data was reviewed by: SERA Read on 05/30/2024:  UDS   Amphetamine Screen, Urine   Date Value Ref Range Status   05/30/2024 Negative Negative Final     AMP INTERNAL CONTROL   Date Value Ref Range Status   05/30/2024 Passed Passed Final     Barbiturates Screen, Urine   Date Value Ref Range Status   05/30/2024 Negative Negative Final     Buprenorphine, Screen, Urine   Date Value Ref Range Status   05/30/2024 Negative Negative Final     BUPRENORPHINE INTERNAL CONTROL   Date Value Ref Range Status   05/30/2024 Passed Passed Final     Benzodiazepine Screen, Urine   Date Value Ref Range Status   05/30/2024 Negative Negative Final     BENZODIAZEPINE INTERNAL CONTROL   Date Value Ref Range Status   05/30/2024 Passed Passed Final     Cocaine Screen, Urine   Date Value Ref Range Status   05/30/2024 Negative Negative Final     COCAINE INTERNAL CONTROL   Date Value Ref Range Status   05/30/2024 Passed Passed Final     MDMA (ECSTASY)   Date Value Ref Range Status   05/30/2024 Negative Negative Final     MDMA (ECSTASY) INTERNAL CONTROL   Date Value Ref Range Status   05/30/2024 Passed Passed Final     Methamphetamine, Ur   Date Value Ref Range Status   05/30/2024 Negative Negative Final     METHAMPHETAMINE INTERNAL CONTROL   Date Value Ref Range Status   05/30/2024 Passed Passed Final     Methadone Screen, Urine   Date Value Ref Range Status   05/30/2024 Negative Negative Final     OPIATES INTERNAL CONTROL   Date Value Ref Range Status   05/30/2024 Passed Passed Final     Oxycodone Screen, Urine   Date Value Ref Range Status   05/30/2024 Negative Negative Final     OXYCODONE INTERNAL CONTROL   Date Value Ref Range Status   05/30/2024 Passed Passed Final      PHENCYCLIDINE INTERNAL CONTROL   Date Value Ref Range Status   05/30/2024 Passed Passed Final     THC, Screen, Urine   Date Value Ref Range Status   05/30/2024 Negative Negative Final     THC INTERNAL CONTROL   Date Value Ref Range Status   05/30/2024 Passed Passed Final     Lot Number   Date Value Ref Range Status   05/30/2024 [id number]  Final     Expiration Date   Date Value Ref Range Status   05/30/2024 9/27/25  Final     Common labs          12/12/2023    07:26 2/27/2024    08:28   Common Labs   Glucose 90  81    BUN 23  15    Creatinine 1.15  0.99    Sodium 139  140    Potassium 4.2  4.1    Chloride 103  106    Calcium 9.3  9.1    Albumin 4.1  3.9    Total Bilirubin 0.3  0.2    Alkaline Phosphatase 41  45    AST (SGOT) 22  15    ALT (SGPT) 18  18    WBC  9.81    Hemoglobin  13.0    Hematocrit  40.3    Platelets  379    Total Cholesterol 199  212    Triglycerides 175  275    HDL Cholesterol 44  39    LDL Cholesterol  124  124        Mammo Screening Digital Tomosynthesis Bilateral With CAD    Result Date: 5/8/2024   1. Left breast: Need additional imaging. Recommend a left diagnostic mammogram and left breast ultrasound.  2. Right breast: Benign mammogram.  BI-RADS ASSESSMENT: BI-RADS 0. Incomplete.  Need Additional Imaging Evaluation and/or Prior Mammograms for Comparison.   The patient's information is entered into a computerized reminder system with a targeted due date for the next mammogram.  Note:  It has been reported that there is approximately a 15% false negative rate in mammography.  Therefore, management of a palpable abnormality should not be deferred because of a negative mammogram.     Electronically Signed By-ZHANG SERRA MD On:5/8/2024 9:47 AM      Assessment & Plan  Annual physical exam  Health maintenance and prevention discussed, handouts provided, see AVS  Primary hypertension  Hypertension is stable and controlled  Continue current treatment regimen.  Weight loss.  Blood pressure will be  reassessed in 1 month.  Mixed hyperlipidemia   Lipid abnormalities are stable    Plan:  Continue same medication/s without change.      Discussed medication dosage, use, side effects, and goals of treatment in detail.    Counseled patient on lifestyle modifications to help control hyperlipidemia.     Patient Treatment Goals:   LDL goal is less than 70    Followup in 3 months.  Generalized anxiety disorder  Psychological condition is stable.  Continue current treatment regimen.  Psychological condition  will be reassessed at the next regular appointment.  Class 3 severe obesity with serious comorbidity and body mass index (BMI) of 40.0 to 44.9 in adult, unspecified obesity type  Patient's (Body mass index is 44.5 kg/m².) indicates that they are morbidly/severely obese (BMI > 40 or > 35 with obesity - related health condition) with health conditions that include hypertension and dyslipidemias . Weight is unchanged. BMI  is above average; BMI management plan is completed. We discussed portion control, increasing exercise, pharmacologic options including starting phentermine, and Information on healthy weight added to patient's after visit summary.  Handouts on portion control and increasing exercise provided, see AVS.  Discussed with patient phentermine for weight loss if short-term and not intended for long-term weight loss therapy.  Discussed will need to continue to follow a low calorie diet and exercise routinely.  Discussed that phentermine will be given on a month to month basis, and that a weight loss of at least 4 pounds needs to be seen each month to continue phentermine.  Discussed will need to take a break from phentermine every 3 months for at least 1 month.  Discussed potential to regain weight after stopping phentermine, and patient will need to continue diet and exercise.  Discussed that phentermine is a controlled medicine and will need to be guarded as such, discussed not to share medication with  others.  Discussed potential side effects to include but not limited to heart dysrhythmias, chest pain, elevated blood pressure, and/or anxiety.  Instructed to stop medicine if any adverse side effects and to notify me as soon as possible.    Medication monitoring encounter  Urine drug screen in office today is negative which is consistent with medications.    Orders Placed This Encounter   Procedures    POC Urine Drug Screen [facility]     New Medications Ordered This Visit   Medications    traZODone (DESYREL) 50 MG tablet     Sig: Take 1 tablet by mouth At Night As Needed for Sleep. Indications: Trouble Sleeping     Dispense:  30 tablet     Refill:  2    phentermine 30 MG capsule     Sig: Take 1 capsule by mouth Every Morning. Indications: OBESITY     Dispense:  30 capsule     Refill:  0           Diagnosis Plan   1. Annual physical exam        2. Primary hypertension        3. Mixed hyperlipidemia        4. Generalized anxiety disorder        5. Class 3 severe obesity with serious comorbidity and body mass index (BMI) of 40.0 to 44.9 in adult, unspecified obesity type  phentermine 30 MG capsule      6. Medication monitoring encounter  POC Urine Drug Screen [facility]            FOLLOW UP  Return in about 4 weeks (around 6/27/2024).  Patient was given instructions and counseling regarding her condition or for health maintenance advice. Please see specific information pulled into the AVS if appropriate.       CURRENT & DISCONTINUED MEDICATIONS  Current Outpatient Medications   Medication Instructions    amLODIPine (NORVASC) 5 mg, Oral, Daily    buPROPion XL (WELLBUTRIN XL) 300 mg, Oral, Every Morning    citalopram (CELEXA) 40 mg, Oral, Daily    estradiol (ESTRACE) 2 mg, Oral, Daily    fenofibrate 160 mg, Oral, Nightly    hydroCHLOROthiazide 25 mg, Oral, Daily    phentermine 30 mg, Oral, Every Morning    simvastatin (ZOCOR) 40 mg, Oral, Nightly    traZODone (DESYREL) 50 mg, Oral, Nightly PRN        Medications Discontinued During This Encounter   Medication Reason    traZODone (DESYREL) 50 MG tablet Reorder        Parts of this note are electronic transcriptions/translations of spoken language to printed text using the Dragon Dictation system.    SERA Read  05/30/24  09:35 EDT

## 2024-05-30 NOTE — ASSESSMENT & PLAN NOTE
Hypertension is stable and controlled  Continue current treatment regimen.  Weight loss.  Blood pressure will be reassessed in 1 month.

## 2024-06-04 ENCOUNTER — HOSPITAL ENCOUNTER (OUTPATIENT)
Dept: MAMMOGRAPHY | Facility: HOSPITAL | Age: 44
Discharge: HOME | End: 2024-06-04
Payer: COMMERCIAL

## 2024-06-04 ENCOUNTER — HOSPITAL ENCOUNTER (OUTPATIENT)
Dept: ULTRASOUND IMAGING | Facility: HOSPITAL | Age: 44
Discharge: HOME | End: 2024-06-04
Payer: COMMERCIAL

## 2024-06-04 DIAGNOSIS — R92.8 ABNORMAL MAMMOGRAM: Primary | ICD-10-CM

## 2024-06-04 DIAGNOSIS — R92.8 ABNORMAL MAMMOGRAM: ICD-10-CM

## 2024-06-04 PROCEDURE — 77065 DX MAMMO INCL CAD UNI: CPT

## 2024-06-04 PROCEDURE — G0279 TOMOSYNTHESIS, MAMMO: HCPCS

## 2024-06-04 PROCEDURE — 76642 ULTRASOUND BREAST LIMITED: CPT

## 2024-06-23 NOTE — PROGRESS NOTES
"Chief Complaint  Weight Check    Subjective            Natalya Bynum is a 44 y.o. female who presents to [facility]   History of Present Illness  1 month follow-up on weight loss management.  I did start her on phentermine.  She has lost 8 pounds.  She states she is tolerating medicine well.  She denies any chest pain, heart palpitations or any other side effects.    Hypertension: She states she stopped taking hydrochlorothiazide because it was causing a dry cough.  She is taking amlodipine 5 mg daily.  Her blood pressure is stable today.        Tobacco Use: Medium Risk (6/24/2024)    Patient History     Smoking Tobacco Use: Former     Smokeless Tobacco Use: Never     Passive Exposure: Not on file      E-cigarette/Vaping    E-cigarette/Vaping Use Never User      E-cigarette/Vaping Substances     E-cigarette/Vaping Devices       Alcohol Use: Not on file         Objective   Vital Signs:   Vitals:    06/24/24 0658 06/24/24 0702   BP: 143/99 132/88   BP Location: Left arm    Patient Position: Sitting    Cuff Size: Adult    Pulse: 92    Temp: 97.8 °F (36.6 °C)    SpO2: 97%    Weight: 118 kg (259 lb 9.6 oz)    Height: 165.1 cm (65\")      Body mass index is 43.2 kg/m².    Wt Readings from Last 3 Encounters:   06/24/24 118 kg (259 lb 9.6 oz)   05/30/24 121 kg (267 lb 6.4 oz)   03/07/24 121 kg (266 lb)     BP Readings from Last 3 Encounters:   06/24/24 132/88   05/30/24 138/86   03/07/24 138/82       Health Maintenance   Topic Date Due    COLORECTAL CANCER SCREENING  Never done    HEPATITIS C SCREENING  11/27/2024 (Originally 11/9/2021)    INFLUENZA VACCINE  08/01/2024    LIPID PANEL  02/27/2025    ANNUAL PHYSICAL  05/30/2025    BMI FOLLOWUP  06/24/2025    MAMMOGRAM  06/04/2026    TDAP/TD VACCINES (2 - Td or Tdap) 07/18/2033    COVID-19 Vaccine  Completed    Pneumococcal Vaccine 0-64  Aged Out       /88   Pulse 92   Temp 97.8 °F (36.6 °C)   Ht 165.1 cm (65\")   Wt 118 kg (259 lb " 9.6 oz)   SpO2 97%   BMI 43.20 kg/m²       Current Outpatient Medications:     amLODIPine (NORVASC) 5 MG tablet, TAKE 1 TABLET BY MOUTH DAILY, Disp: 90 tablet, Rfl: 0    buPROPion XL (WELLBUTRIN XL) 300 MG 24 hr tablet, Take 1 tablet by mouth Every Morning. Indications: difficulty concentrating, Disp: 90 tablet, Rfl: 1    citalopram (CeleXA) 40 MG tablet, Take 1 tablet by mouth Daily. Indications: Generalized Anxiety Disorder, Disp: 90 tablet, Rfl: 1    estradiol (Estrace) 2 MG tablet, Take 1 tablet by mouth Daily., Disp: 90 tablet, Rfl: 3    fenofibrate 160 MG tablet, Take 1 tablet by mouth Every Night. Indications: Elevation of Both Cholesterol and Triglycerides in Blood, Disp: 90 tablet, Rfl: 1    phentermine 30 MG capsule, Take 1 capsule by mouth Every Morning. Indications: OBESITY, Disp: 30 capsule, Rfl: 0    simvastatin (ZOCOR) 40 MG tablet, Take 1 tablet by mouth Every Night. Indications: High Amount of Fats in the Blood, Disp: 90 tablet, Rfl: 1    traZODone (DESYREL) 50 MG tablet, Take 1 tablet by mouth At Night As Needed for Sleep. Indications: Trouble Sleeping, Disp: 30 tablet, Rfl: 2   Past Medical History:   Diagnosis Date    Abnormal Pap smear of cervix     Cervical dysplasia     H. pylori infection     HPV (human papilloma virus) infection     Hyperlipidemia     Hypertension 2023    LGSIL Pap smear of vagina     Migraine         Physical Exam  Vitals reviewed.   Constitutional:       Appearance: Normal appearance. She is well-developed. She is morbidly obese.   Neck:      Thyroid: No thyroid mass, thyromegaly or thyroid tenderness.   Cardiovascular:      Rate and Rhythm: Normal rate and regular rhythm.      Heart sounds: No murmur heard.     No friction rub. No gallop.   Pulmonary:      Effort: Pulmonary effort is normal.      Breath sounds: Normal breath sounds. No wheezing or rhonchi.   Lymphadenopathy:      Cervical: No cervical adenopathy.   Skin:     General: Skin is warm and dry.    Neurological:      Mental Status: She is alert and oriented to person, place, and time.      Cranial Nerves: No cranial nerve deficit.   Psychiatric:         Mood and Affect: Mood and affect normal.         Behavior: Behavior normal.         Thought Content: Thought content normal. Thought content does not include homicidal or suicidal ideation.         Judgment: Judgment normal.          Result Review :    The following data was reviewed by: SERA Read on 06/24/2024:  Common Labs   Common labs          12/12/2023    07:26 2/27/2024    08:28   Common Labs   Glucose 90  81    BUN 23  15    Creatinine 1.15  0.99    Sodium 139  140    Potassium 4.2  4.1    Chloride 103  106    Calcium 9.3  9.1    Albumin 4.1  3.9    Total Bilirubin 0.3  0.2    Alkaline Phosphatase 41  45    AST (SGOT) 22  15    ALT (SGPT) 18  18    WBC  9.81    Hemoglobin  13.0    Hematocrit  40.3    Platelets  379    Total Cholesterol 199  212    Triglycerides 175  275    HDL Cholesterol 44  39    LDL Cholesterol  124  124        Mammo Diagnostic Digital Tomosynthesis Left With CAD    Result Date: 6/4/2024   IMPRESSION: Probably benign diagnostic left mammogram and targeted left breast ultrasound. Follow-up targeted left breast ultrasound is recommended in 3 months.    TISSUE DENSITY: There are scattered areas of fibroglandular density.  BI-RADS ASSESSMENT: BI-RADS 3. Probably Benign.  Short interval followup recommended.   Note: It has been reported that there is approximately a 15% false negative in mammography. Therefore, management of a palpable abnormality should not be deferred because of a negative mammogram.    Electronically Signed By-CELSA CARRILLO MD On:6/4/2024 1:55 PM      US Breast Left Limited    Result Date: 6/4/2024   IMPRESSION: Probably benign diagnostic left mammogram and targeted left breast ultrasound. Follow-up targeted left breast ultrasound is recommended in 3 months.    TISSUE DENSITY: There are scattered  areas of fibroglandular density.  BI-RADS ASSESSMENT: BI-RADS 3. Probably Benign.  Short interval followup recommended.   Note: It has been reported that there is approximately a 15% false negative in mammography. Therefore, management of a palpable abnormality should not be deferred because of a negative mammogram.    Electronically Signed By-CELSA CARRILLO MD On:6/4/2024 1:55 PM      Mammo Screening Digital Tomosynthesis Bilateral With CAD    Result Date: 5/8/2024   1. Left breast: Need additional imaging. Recommend a left diagnostic mammogram and left breast ultrasound.  2. Right breast: Benign mammogram.  BI-RADS ASSESSMENT: BI-RADS 0. Incomplete.  Need Additional Imaging Evaluation and/or Prior Mammograms for Comparison.   The patient's information is entered into a computerized reminder system with a targeted due date for the next mammogram.  Note:  It has been reported that there is approximately a 15% false negative rate in mammography.  Therefore, management of a palpable abnormality should not be deferred because of a negative mammogram.     Electronically Signed By-ZHANG SERRA MD On:5/8/2024 9:47 AM      Assessment & Plan  Class 3 severe obesity with serious comorbidity and body mass index (BMI) of 40.0 to 44.9 in adult, unspecified obesity type  Patient's (Body mass index is 43.2 kg/m².) indicates that they are morbidly/severely obese (BMI > 40 or > 35 with obesity - related health condition) with health conditions that include hypertension and dyslipidemias . Weight is improving with treatment. BMI  is above average; BMI management plan is completed. We discussed portion control, increasing exercise, and pharmacologic options including continuing phentermine .   Primary hypertension  Hypertension is stable and controlled  Continue current treatment regimen.  Weight loss.  Blood pressure will be reassessed in 1 month.     New Medications Ordered This Visit   Medications    phentermine 30 MG capsule      Sig: Take 1 capsule by mouth Every Morning. Indications: OBESITY     Dispense:  30 capsule     Refill:  0          Diagnosis Plan   1. Primary hypertension        2. Class 3 severe obesity with serious comorbidity and body mass index (BMI) of 40.0 to 44.9 in adult, unspecified obesity type  phentermine 30 MG capsule            FOLLOW UP  Return in about 4 weeks (around 7/22/2024) for Next scheduled follow up.  Patient was given instructions and counseling regarding her condition or for health maintenance advice. Please see specific information pulled into the AVS if appropriate.       CURRENT & DISCONTINUED MEDICATIONS  Current Outpatient Medications   Medication Instructions    amLODIPine (NORVASC) 5 mg, Oral, Daily    buPROPion XL (WELLBUTRIN XL) 300 mg, Oral, Every Morning    citalopram (CELEXA) 40 mg, Oral, Daily    estradiol (ESTRACE) 2 mg, Oral, Daily    fenofibrate 160 mg, Oral, Nightly    phentermine 30 mg, Oral, Every Morning    simvastatin (ZOCOR) 40 mg, Oral, Nightly    traZODone (DESYREL) 50 mg, Oral, Nightly PRN       Medications Discontinued During This Encounter   Medication Reason    hydroCHLOROthiazide 25 MG tablet Side effects    phentermine 30 MG capsule Reorder        Parts of this note are electronic transcriptions/translations of spoken language to printed text using the Dragon Dictation system.    SERA Read  06/24/24  07:29 EDT

## 2024-06-24 ENCOUNTER — OFFICE VISIT (OUTPATIENT)
Dept: FAMILY MEDICINE CLINIC | Facility: CLINIC | Age: 44
End: 2024-06-24
Payer: COMMERCIAL

## 2024-06-24 VITALS
DIASTOLIC BLOOD PRESSURE: 88 MMHG | SYSTOLIC BLOOD PRESSURE: 132 MMHG | HEART RATE: 92 BPM | WEIGHT: 259.6 LBS | OXYGEN SATURATION: 97 % | TEMPERATURE: 97.8 F | HEIGHT: 65 IN | BODY MASS INDEX: 43.25 KG/M2

## 2024-06-24 DIAGNOSIS — E66.01 CLASS 3 SEVERE OBESITY WITH SERIOUS COMORBIDITY AND BODY MASS INDEX (BMI) OF 40.0 TO 44.9 IN ADULT, UNSPECIFIED OBESITY TYPE: ICD-10-CM

## 2024-06-24 DIAGNOSIS — I10 PRIMARY HYPERTENSION: Primary | ICD-10-CM

## 2024-06-24 PROCEDURE — 99214 OFFICE O/P EST MOD 30 MIN: CPT | Performed by: NURSE PRACTITIONER

## 2024-06-24 RX ORDER — PHENTERMINE HYDROCHLORIDE 30 MG/1
30 CAPSULE ORAL EVERY MORNING
Qty: 30 CAPSULE | Refills: 0 | Status: SHIPPED | OUTPATIENT
Start: 2024-06-24

## 2024-07-05 ENCOUNTER — PATIENT ROUNDING (BHMG ONLY) (OUTPATIENT)
Dept: FAMILY MEDICINE CLINIC | Facility: CLINIC | Age: 44
End: 2024-07-05
Payer: COMMERCIAL

## 2024-07-25 ENCOUNTER — OFFICE VISIT (OUTPATIENT)
Dept: FAMILY MEDICINE CLINIC | Facility: CLINIC | Age: 44
End: 2024-07-25
Payer: COMMERCIAL

## 2024-07-25 VITALS
TEMPERATURE: 96.6 F | HEART RATE: 69 BPM | DIASTOLIC BLOOD PRESSURE: 88 MMHG | OXYGEN SATURATION: 100 % | WEIGHT: 245.5 LBS | HEIGHT: 65 IN | SYSTOLIC BLOOD PRESSURE: 138 MMHG | BODY MASS INDEX: 40.9 KG/M2

## 2024-07-25 DIAGNOSIS — F41.9 ANXIETY: ICD-10-CM

## 2024-07-25 DIAGNOSIS — I10 PRIMARY HYPERTENSION: ICD-10-CM

## 2024-07-25 DIAGNOSIS — R41.840 DIFFICULTY CONCENTRATING: ICD-10-CM

## 2024-07-25 DIAGNOSIS — E78.2 MIXED HYPERLIPIDEMIA: Chronic | ICD-10-CM

## 2024-07-25 DIAGNOSIS — E66.01 CLASS 3 SEVERE OBESITY WITH SERIOUS COMORBIDITY AND BODY MASS INDEX (BMI) OF 40.0 TO 44.9 IN ADULT, UNSPECIFIED OBESITY TYPE: ICD-10-CM

## 2024-07-25 LAB
ALBUMIN SERPL-MCNC: 4.2 G/DL (ref 3.5–5.2)
ALBUMIN/GLOB SERPL: 1.6 G/DL
ALP SERPL-CCNC: 38 U/L (ref 39–117)
ALT SERPL W P-5'-P-CCNC: 28 U/L (ref 1–33)
ANION GAP SERPL CALCULATED.3IONS-SCNC: 12.8 MMOL/L (ref 5–15)
AST SERPL-CCNC: 25 U/L (ref 1–32)
BASOPHILS # BLD AUTO: 0.1 10*3/MM3 (ref 0–0.2)
BASOPHILS NFR BLD AUTO: 1 % (ref 0–1.5)
BILIRUB SERPL-MCNC: 0.4 MG/DL (ref 0–1.2)
BUN SERPL-MCNC: 22 MG/DL (ref 6–20)
BUN/CREAT SERPL: 19.3 (ref 7–25)
CALCIUM SPEC-SCNC: 9.1 MG/DL (ref 8.6–10.5)
CHLORIDE SERPL-SCNC: 102 MMOL/L (ref 98–107)
CHOLEST SERPL-MCNC: 130 MG/DL (ref 0–200)
CO2 SERPL-SCNC: 22.2 MMOL/L (ref 22–29)
CREAT SERPL-MCNC: 1.14 MG/DL (ref 0.57–1)
DEPRECATED RDW RBC AUTO: 39.2 FL (ref 37–54)
EGFRCR SERPLBLD CKD-EPI 2021: 60.6 ML/MIN/1.73
EOSINOPHIL # BLD AUTO: 0.22 10*3/MM3 (ref 0–0.4)
EOSINOPHIL NFR BLD AUTO: 2.3 % (ref 0.3–6.2)
ERYTHROCYTE [DISTWIDTH] IN BLOOD BY AUTOMATED COUNT: 12.4 % (ref 12.3–15.4)
GLOBULIN UR ELPH-MCNC: 2.7 GM/DL
GLUCOSE SERPL-MCNC: 80 MG/DL (ref 65–99)
HCT VFR BLD AUTO: 40.5 % (ref 34–46.6)
HDLC SERPL-MCNC: 43 MG/DL (ref 40–60)
HGB BLD-MCNC: 13.2 G/DL (ref 12–15.9)
IMM GRANULOCYTES # BLD AUTO: 0.02 10*3/MM3 (ref 0–0.05)
IMM GRANULOCYTES NFR BLD AUTO: 0.2 % (ref 0–0.5)
LDLC SERPL CALC-MCNC: 66 MG/DL (ref 0–100)
LDLC/HDLC SERPL: 1.48 {RATIO}
LYMPHOCYTES # BLD AUTO: 2.85 10*3/MM3 (ref 0.7–3.1)
LYMPHOCYTES NFR BLD AUTO: 29.5 % (ref 19.6–45.3)
MCH RBC QN AUTO: 28.7 PG (ref 26.6–33)
MCHC RBC AUTO-ENTMCNC: 32.6 G/DL (ref 31.5–35.7)
MCV RBC AUTO: 88 FL (ref 79–97)
MONOCYTES # BLD AUTO: 0.94 10*3/MM3 (ref 0.1–0.9)
MONOCYTES NFR BLD AUTO: 9.7 % (ref 5–12)
NEUTROPHILS NFR BLD AUTO: 5.54 10*3/MM3 (ref 1.7–7)
NEUTROPHILS NFR BLD AUTO: 57.3 % (ref 42.7–76)
NRBC BLD AUTO-RTO: 0 /100 WBC (ref 0–0.2)
PLATELET # BLD AUTO: 392 10*3/MM3 (ref 140–450)
PMV BLD AUTO: 11.7 FL (ref 6–12)
POTASSIUM SERPL-SCNC: 4.1 MMOL/L (ref 3.5–5.2)
PROT SERPL-MCNC: 6.9 G/DL (ref 6–8.5)
RBC # BLD AUTO: 4.6 10*6/MM3 (ref 3.77–5.28)
SODIUM SERPL-SCNC: 137 MMOL/L (ref 136–145)
TRIGL SERPL-MCNC: 117 MG/DL (ref 0–150)
VLDLC SERPL-MCNC: 21 MG/DL (ref 5–40)
WBC NRBC COR # BLD AUTO: 9.67 10*3/MM3 (ref 3.4–10.8)

## 2024-07-25 PROCEDURE — 85025 COMPLETE CBC W/AUTO DIFF WBC: CPT | Performed by: NURSE PRACTITIONER

## 2024-07-25 PROCEDURE — 80061 LIPID PANEL: CPT | Performed by: NURSE PRACTITIONER

## 2024-07-25 PROCEDURE — 99214 OFFICE O/P EST MOD 30 MIN: CPT | Performed by: NURSE PRACTITIONER

## 2024-07-25 PROCEDURE — 80053 COMPREHEN METABOLIC PANEL: CPT | Performed by: NURSE PRACTITIONER

## 2024-07-25 RX ORDER — TRAZODONE HYDROCHLORIDE 50 MG/1
50 TABLET ORAL NIGHTLY PRN
Qty: 90 TABLET | Refills: 1 | Status: SHIPPED | OUTPATIENT
Start: 2024-07-25

## 2024-07-25 RX ORDER — AMLODIPINE BESYLATE 5 MG/1
5 TABLET ORAL DAILY
Qty: 90 TABLET | Refills: 1 | Status: SHIPPED | OUTPATIENT
Start: 2024-07-25

## 2024-07-25 RX ORDER — PHENTERMINE HYDROCHLORIDE 30 MG/1
30 CAPSULE ORAL EVERY MORNING
Qty: 30 CAPSULE | Refills: 0 | Status: SHIPPED | OUTPATIENT
Start: 2024-07-25

## 2024-07-25 RX ORDER — SIMVASTATIN 40 MG
40 TABLET ORAL NIGHTLY
Qty: 90 TABLET | Refills: 1 | Status: SHIPPED | OUTPATIENT
Start: 2024-07-25

## 2024-07-25 RX ORDER — BUPROPION HYDROCHLORIDE 300 MG/1
300 TABLET ORAL EVERY MORNING
Qty: 90 TABLET | Refills: 1 | Status: SHIPPED | OUTPATIENT
Start: 2024-07-25

## 2024-07-25 RX ORDER — CITALOPRAM 40 MG/1
40 TABLET ORAL DAILY
Qty: 90 TABLET | Refills: 1 | Status: SHIPPED | OUTPATIENT
Start: 2024-07-25

## 2024-07-25 NOTE — PROGRESS NOTES
"Chief Complaint  Weight Check, Hypertension, and Hyperlipidemia    Subjective            Natalya Bynum is a 44 y.o. female who presents to [facility]   History of Present Illness  1 month follow-up on weight loss management.  I did start her on phentermine.  She has lost 14 pounds in 1 month.  She states she is tolerating medicine well.  She denies any chest pain, heart palpitations or any other side effects. She is following high protein and low carb diet, using [employer] will. She is also working out at the gym 3 days per week.     Hypertension: She is taking amlodipine 5 mg daily.  Her blood pressure is stable today.    Hyperlipidemia: She is on simvastatin 40 mg and fenofibrate 160 mg every evening.    Anxiety: She is currently stable on Celexa 40 mg daily.  She also takes Wellbutrin to help with concentration and is doing well and needs refills.      Tobacco Use: Medium Risk (7/25/2024)    Patient History     Smoking Tobacco Use: Former     Smokeless Tobacco Use: Never     Passive Exposure: Not on file      E-cigarette/Vaping    E-cigarette/Vaping Use Never User      E-cigarette/Vaping Substances     E-cigarette/Vaping Devices       Alcohol Use: Not on file         Objective   Vital Signs:   Vitals:    07/25/24 0720 07/25/24 0723   BP: 147/92 138/88   BP Location: Left arm    Patient Position: Sitting    Cuff Size: Adult    Pulse: 69    Temp: 96.6 °F (35.9 °C)    SpO2: 100%    Weight: 111 kg (245 lb 8 oz)    Height: 165.1 cm (65\")      Body mass index is 40.85 kg/m².    Wt Readings from Last 3 Encounters:   07/25/24 111 kg (245 lb 8 oz)   06/24/24 118 kg (259 lb 9.6 oz)   05/30/24 121 kg (267 lb 6.4 oz)     BP Readings from Last 3 Encounters:   07/25/24 138/88   06/24/24 132/88   05/30/24 138/86       Health Maintenance   Topic Date Due    COLORECTAL CANCER SCREENING  Never done    HEPATITIS C SCREENING  11/27/2024 (Originally 11/9/2021)    INFLUENZA VACCINE  08/01/2024 " "   LIPID PANEL  02/27/2025    ANNUAL PHYSICAL  05/30/2025    BMI FOLLOWUP  07/25/2025    MAMMOGRAM  06/04/2026    TDAP/TD VACCINES (2 - Td or Tdap) 07/18/2033    COVID-19 Vaccine  Completed    Pneumococcal Vaccine 0-64  Aged Out       /88   Pulse 69   Temp 96.6 °F (35.9 °C)   Ht 165.1 cm (65\")   Wt 111 kg (245 lb 8 oz)   SpO2 100%   BMI 40.85 kg/m²       Current Outpatient Medications:     amLODIPine (NORVASC) 5 MG tablet, Take 1 tablet by mouth Daily., Disp: 90 tablet, Rfl: 1    buPROPion XL (WELLBUTRIN XL) 300 MG 24 hr tablet, Take 1 tablet by mouth Every Morning. Indications: difficulty concentrating, Disp: 90 tablet, Rfl: 1    citalopram (CeleXA) 40 MG tablet, Take 1 tablet by mouth Daily. Indications: Generalized Anxiety Disorder, Disp: 90 tablet, Rfl: 1    estradiol (Estrace) 2 MG tablet, Take 1 tablet by mouth Daily., Disp: 90 tablet, Rfl: 3    fenofibrate 160 MG tablet, Take 1 tablet by mouth Every Night. Indications: Elevation of Both Cholesterol and Triglycerides in Blood, Disp: 90 tablet, Rfl: 1    phentermine 30 MG capsule, Take 1 capsule by mouth Every Morning. Indications: OBESITY, Disp: 30 capsule, Rfl: 0    simvastatin (ZOCOR) 40 MG tablet, Take 1 tablet by mouth Every Night. Indications: High Amount of Fats in the Blood, Disp: 90 tablet, Rfl: 1    traZODone (DESYREL) 50 MG tablet, Take 1 tablet by mouth At Night As Needed for Sleep. Indications: Trouble Sleeping, Disp: 90 tablet, Rfl: 1   Past Medical History:   Diagnosis Date    Abnormal Pap smear of cervix     Cervical dysplasia     H. pylori infection     HPV (human papilloma virus) infection     Hyperlipidemia     Hypertension 2023    LGSIL Pap smear of vagina     Migraine         Physical Exam  Vitals reviewed.   Constitutional:       Appearance: Normal appearance. She is well-developed. She is morbidly obese.   Neck:      Thyroid: No thyroid mass, thyromegaly or thyroid tenderness.   Cardiovascular:      Rate and Rhythm: Normal " rate and regular rhythm.      Heart sounds: No murmur heard.     No friction rub. No gallop.   Pulmonary:      Effort: Pulmonary effort is normal.      Breath sounds: Normal breath sounds. No wheezing or rhonchi.   Lymphadenopathy:      Cervical: No cervical adenopathy.   Skin:     General: Skin is warm and dry.   Neurological:      Mental Status: She is alert and oriented to person, place, and time.      Cranial Nerves: No cranial nerve deficit.   Psychiatric:         Mood and Affect: Mood and affect normal.         Behavior: Behavior normal.         Thought Content: Thought content normal. Thought content does not include homicidal or suicidal ideation.         Judgment: Judgment normal.          Result Review :    The following data was reviewed by: SERA Read on 07/25/2024:  Common Labs   Common labs          12/12/2023    07:26 2/27/2024    08:28   Common Labs   Glucose 90  81    BUN 23  15    Creatinine 1.15  0.99    Sodium 139  140    Potassium 4.2  4.1    Chloride 103  106    Calcium 9.3  9.1    Albumin 4.1  3.9    Total Bilirubin 0.3  0.2    Alkaline Phosphatase 41  45    AST (SGOT) 22  15    ALT (SGPT) 18  18    WBC  9.81    Hemoglobin  13.0    Hematocrit  40.3    Platelets  379    Total Cholesterol 199  212    Triglycerides 175  275    HDL Cholesterol 44  39    LDL Cholesterol  124  124      VITD   Lab Results   Component Value Date    HYYH55OF 45.3 12/12/2023     Assessment & Plan  Class 3 severe obesity with serious comorbidity and body mass index (BMI) of 40.0 to 44.9 in adult, unspecified obesity type  Patient's (Body mass index is 40.85 kg/m².) indicates that they are morbidly/severely obese (BMI > 40 or > 35 with obesity - related health condition) with health conditions that include hypertension and dyslipidemias . Weight is  improving with both treatment and lifestyle changes . BMI  is above average; BMI management plan is completed. We discussed low calorie, low carb based diet  program, portion control, increasing exercise, and pharmacologic options including continuing phentermine for 1 more month and then will take a break .  I advised her that if she starts regaining weight that she can schedule follow-up appointment with me to resume phentermine, otherwise she can follow-up in 6 months.  Anxiety  Doing well on Celexa 40 mg daily, will continue current dose.  Difficulty concentrating  Doing well on Wellbutrin 300 mg daily, will continue current dose.  Mixed hyperlipidemia   Lipid abnormalities are stable    Plan:  Continue same medication/s without change.      Discussed medication dosage, use, side effects, and goals of treatment in detail.    Counseled patient on lifestyle modifications to help control hyperlipidemia.     Patient Treatment Goals:   LDL goal is under 100    Followup in 6 months.  Primary hypertension  Hypertension is stable and controlled  Continue current treatment regimen.  Weight loss.  Regular aerobic exercise.  Blood pressure will be reassessed in 6 months.    Orders Placed This Encounter   Procedures    CBC Auto Differential    Comprehensive Metabolic Panel    Lipid Panel     New Medications Ordered This Visit   Medications    phentermine 30 MG capsule     Sig: Take 1 capsule by mouth Every Morning. Indications: OBESITY     Dispense:  30 capsule     Refill:  0    citalopram (CeleXA) 40 MG tablet     Sig: Take 1 tablet by mouth Daily. Indications: Generalized Anxiety Disorder     Dispense:  90 tablet     Refill:  1    buPROPion XL (WELLBUTRIN XL) 300 MG 24 hr tablet     Sig: Take 1 tablet by mouth Every Morning. Indications: difficulty concentrating     Dispense:  90 tablet     Refill:  1    simvastatin (ZOCOR) 40 MG tablet     Sig: Take 1 tablet by mouth Every Night. Indications: High Amount of Fats in the Blood     Dispense:  90 tablet     Refill:  1    amLODIPine (NORVASC) 5 MG tablet     Sig: Take 1 tablet by mouth Daily.     Dispense:  90 tablet     Refill:   1    traZODone (DESYREL) 50 MG tablet     Sig: Take 1 tablet by mouth At Night As Needed for Sleep. Indications: Trouble Sleeping     Dispense:  90 tablet     Refill:  1           Diagnosis Plan   1. Class 3 severe obesity with serious comorbidity and body mass index (BMI) of 40.0 to 44.9 in adult, unspecified obesity type  phentermine 30 MG capsule      2. Anxiety  citalopram (CeleXA) 40 MG tablet      3. Difficulty concentrating  buPROPion XL (WELLBUTRIN XL) 300 MG 24 hr tablet      4. Mixed hyperlipidemia  simvastatin (ZOCOR) 40 MG tablet    Comprehensive Metabolic Panel    Lipid Panel      5. Primary hypertension  amLODIPine (NORVASC) 5 MG tablet    CBC Auto Differential    Comprehensive Metabolic Panel    Lipid Panel            FOLLOW UP  Return in about 6 months (around 1/25/2025).  Patient was given instructions and counseling regarding her condition or for health maintenance advice. Please see specific information pulled into the AVS if appropriate.       CURRENT & DISCONTINUED MEDICATIONS  Current Outpatient Medications   Medication Instructions    amLODIPine (NORVASC) 5 mg, Oral, Daily    buPROPion XL (WELLBUTRIN XL) 300 mg, Oral, Every Morning    citalopram (CELEXA) 40 mg, Oral, Daily    estradiol (ESTRACE) 2 mg, Oral, Daily    fenofibrate 160 mg, Oral, Nightly    phentermine 30 mg, Oral, Every Morning    simvastatin (ZOCOR) 40 mg, Oral, Nightly    traZODone (DESYREL) 50 mg, Oral, Nightly PRN       Medications Discontinued During This Encounter   Medication Reason    citalopram (CeleXA) 40 MG tablet Reorder    buPROPion XL (WELLBUTRIN XL) 300 MG 24 hr tablet Reorder    simvastatin (ZOCOR) 40 MG tablet Reorder    amLODIPine (NORVASC) 5 MG tablet Reorder    traZODone (DESYREL) 50 MG tablet Reorder    phentermine 30 MG capsule Reorder        Parts of this note are electronic transcriptions/translations of spoken language to printed text using the Dragon Dictation system.    Macarena Carter,  SERA  07/25/24  07:48 EDT

## 2024-09-09 ENCOUNTER — HOSPITAL ENCOUNTER (OUTPATIENT)
Dept: ULTRASOUND IMAGING | Facility: HOSPITAL | Age: 44
Discharge: HOME | End: 2024-09-09
Admitting: OBSTETRICS & GYNECOLOGY
Payer: COMMERCIAL

## 2024-09-09 DIAGNOSIS — R92.8 ABNORMAL MAMMOGRAM: ICD-10-CM

## 2024-09-09 PROCEDURE — 76642 ULTRASOUND BREAST LIMITED: CPT

## 2024-09-09 NOTE — PROGRESS NOTES
Please ensure the patient is aware of her breast ultrasound findings.  She will need her screening mammogram May 2025

## 2024-09-20 ENCOUNTER — TELEPHONE (OUTPATIENT)
Dept: FAMILY MEDICINE CLINIC | Facility: CLINIC | Age: 44
End: 2024-09-20
Payer: COMMERCIAL

## 2024-09-20 RX ORDER — SULFAMETHOXAZOLE/TRIMETHOPRIM 800-160 MG
1 TABLET ORAL 2 TIMES DAILY
Qty: 10 TABLET | Refills: 0 | Status: SHIPPED | OUTPATIENT
Start: 2024-09-20 | End: 2024-09-25

## 2024-09-26 ENCOUNTER — OFFICE VISIT (OUTPATIENT)
Dept: FAMILY MEDICINE CLINIC | Facility: CLINIC | Age: 44
End: 2024-09-26
Payer: COMMERCIAL

## 2024-09-26 VITALS
DIASTOLIC BLOOD PRESSURE: 77 MMHG | TEMPERATURE: 97.8 F | OXYGEN SATURATION: 98 % | SYSTOLIC BLOOD PRESSURE: 126 MMHG | HEIGHT: 65 IN | HEART RATE: 78 BPM | WEIGHT: 232.6 LBS | BODY MASS INDEX: 38.75 KG/M2

## 2024-09-26 DIAGNOSIS — E66.01 CLASS 2 SEVERE OBESITY WITH SERIOUS COMORBIDITY AND BODY MASS INDEX (BMI) OF 38.0 TO 38.9 IN ADULT, UNSPECIFIED OBESITY TYPE: ICD-10-CM

## 2024-09-26 DIAGNOSIS — M79.604 PAIN AND SWELLING OF LOWER EXTREMITY, RIGHT: Primary | ICD-10-CM

## 2024-09-26 DIAGNOSIS — M79.89 PAIN AND SWELLING OF LOWER EXTREMITY, RIGHT: Primary | ICD-10-CM

## 2024-09-26 PROCEDURE — 99214 OFFICE O/P EST MOD 30 MIN: CPT | Performed by: NURSE PRACTITIONER

## 2024-09-26 RX ORDER — TRIAMCINOLONE ACETONIDE 40 MG/ML
40 INJECTION, SUSPENSION INTRA-ARTICULAR; INTRAMUSCULAR ONCE
Status: COMPLETED | OUTPATIENT
Start: 2024-09-26 | End: 2024-09-26

## 2024-09-26 RX ORDER — IBUPROFEN 800 MG/1
800 TABLET, FILM COATED ORAL EVERY 8 HOURS PRN
Qty: 90 TABLET | Refills: 0 | Status: SHIPPED | OUTPATIENT
Start: 2024-09-26

## 2024-09-26 RX ORDER — PHENTERMINE HYDROCHLORIDE 30 MG/1
30 CAPSULE ORAL EVERY MORNING
Qty: 30 CAPSULE | Refills: 0 | Status: SHIPPED | OUTPATIENT
Start: 2024-09-26

## 2024-09-26 RX ORDER — METHOCARBAMOL 750 MG/1
750 TABLET, FILM COATED ORAL 4 TIMES DAILY PRN
Qty: 60 TABLET | Refills: 0 | Status: SHIPPED | OUTPATIENT
Start: 2024-09-26

## 2024-09-26 RX ADMIN — TRIAMCINOLONE ACETONIDE 40 MG: 40 INJECTION, SUSPENSION INTRA-ARTICULAR; INTRAMUSCULAR at 10:41

## 2024-10-01 RX ORDER — BACLOFEN 20 MG/1
20 TABLET ORAL 3 TIMES DAILY PRN
Qty: 45 TABLET | Refills: 0 | Status: SHIPPED | OUTPATIENT
Start: 2024-10-01

## 2024-10-01 RX ORDER — ETODOLAC 500 MG
500 TABLET ORAL 2 TIMES DAILY
Qty: 60 TABLET | Refills: 0 | Status: SHIPPED | OUTPATIENT
Start: 2024-10-01

## 2024-10-18 ENCOUNTER — TELEPHONE (OUTPATIENT)
Dept: FAMILY MEDICINE CLINIC | Facility: CLINIC | Age: 44
End: 2024-10-18
Payer: COMMERCIAL

## 2024-10-24 DIAGNOSIS — E66.01 CLASS 2 SEVERE OBESITY WITH SERIOUS COMORBIDITY AND BODY MASS INDEX (BMI) OF 38.0 TO 38.9 IN ADULT, UNSPECIFIED OBESITY TYPE: ICD-10-CM

## 2024-10-24 DIAGNOSIS — E66.812 CLASS 2 SEVERE OBESITY WITH SERIOUS COMORBIDITY AND BODY MASS INDEX (BMI) OF 38.0 TO 38.9 IN ADULT, UNSPECIFIED OBESITY TYPE: ICD-10-CM

## 2024-10-24 RX ORDER — PHENTERMINE HYDROCHLORIDE 30 MG/1
30 CAPSULE ORAL EVERY MORNING
Qty: 30 CAPSULE | Refills: 0 | Status: SHIPPED | OUTPATIENT
Start: 2024-10-24

## 2024-11-03 NOTE — PROGRESS NOTES
"Chief Complaint  Weight Check, Anxiety, and Hyperlipidemia    Subjective            Natalya MANE Bynmu is a 44 y.o. female who presents to [facility]   History of Present Illness  She is here today for follow-up on weight loss management.  She had injured her right leg at the gym which is now limiting her exercise.  She states she is getting back into exercising again.  I have restarted her on phentermine on 9/26/2024.  I did refill her phentermine on 10/24/2024 while I was out of the office.  She has lost 11 pounds since 9/26/2024.    Hyperlipidemia: She is currently on fenofibrate 160 mg every evening and needs a refill.  She is also on simvastatin 40 mg every evening.    Generalized anxiety disorder/difficulty concentrating: She is currently taking Wellbutrin 300 mg daily but does not feel like it is helping with her concentration.  She is also on Celexa 40 mg daily.      Tobacco Use: Medium Risk (11/4/2024)    Patient History     Smoking Tobacco Use: Former     Smokeless Tobacco Use: Never     Passive Exposure: Not on file      E-cigarette/Vaping    E-cigarette/Vaping Use Never User      E-cigarette/Vaping Substances     E-cigarette/Vaping Devices       Alcohol Use: Not on file         Objective   Vital Signs:   Vitals:    11/04/24 1111 11/04/24 1120   BP: 151/97 144/92   BP Location: Left arm    Patient Position: Sitting    Cuff Size: Adult    Pulse: 84    Temp: 97.8 °F (36.6 °C)    SpO2: 98%    Weight: 100 kg (221 lb)    Height: 165.1 cm (65\")    PainSc: 0-No pain      Body mass index is 36.78 kg/m².    Wt Readings from Last 3 Encounters:   11/04/24 100 kg (221 lb)   09/26/24 106 kg (232 lb 9.6 oz)   07/25/24 111 kg (245 lb 8 oz)     BP Readings from Last 3 Encounters:   11/04/24 144/92   09/26/24 126/77   07/25/24 138/88       Health Maintenance   Topic Date Due    COLORECTAL CANCER SCREENING  Never done    HEPATITIS C SCREENING  11/27/2024 (Originally 11/9/2021)    COVID-19 " "Vaccine (4 - 2024-25 season) 11/04/2025 (Originally 9/1/2024)    ANNUAL PHYSICAL  05/30/2025    LIPID PANEL  07/25/2025    BMI FOLLOWUP  11/04/2025    MAMMOGRAM  06/04/2026    TDAP/TD VACCINES (2 - Td or Tdap) 07/18/2033    INFLUENZA VACCINE  Completed    Pneumococcal Vaccine 0-64  Aged Out       /92   Pulse 84   Temp 97.8 °F (36.6 °C)   Ht 165.1 cm (65\")   Wt 100 kg (221 lb)   SpO2 98%   BMI 36.78 kg/m²       Current Outpatient Medications:     amLODIPine (NORVASC) 5 MG tablet, Take 1 tablet by mouth Daily., Disp: 90 tablet, Rfl: 1    buPROPion XL (WELLBUTRIN XL) 300 MG 24 hr tablet, Take 1 tablet by mouth Every Morning. Indications: difficulty concentrating, Disp: 90 tablet, Rfl: 1    estradiol (Estrace) 2 MG tablet, Take 1 tablet by mouth Daily., Disp: 90 tablet, Rfl: 3    etodolac (LODINE) 500 MG tablet, Take 1 tablet by mouth 2 (Two) Times a Day. Indications: Pain, and inflammation, Disp: 60 tablet, Rfl: 2    fenofibrate 160 MG tablet, Take 1 tablet by mouth Every Night. Indications: Elevation of Both Cholesterol and Triglycerides in Blood, Disp: 90 tablet, Rfl: 1    phentermine 30 MG capsule, Take 1 capsule by mouth Every Morning., Disp: 30 capsule, Rfl: 0    simvastatin (ZOCOR) 40 MG tablet, Take 1 tablet by mouth Every Night. Indications: High Amount of Fats in the Blood, Disp: 90 tablet, Rfl: 1    traZODone (DESYREL) 50 MG tablet, Take 1 tablet by mouth At Night As Needed for Sleep. Indications: Trouble Sleeping, Disp: 90 tablet, Rfl: 1    Vortioxetine HBr (Trintellix) 10 MG tablet tablet, Take 1 tablet by mouth Daily With Breakfast. Indications: anxiety and difficulty concentrating, Disp: 30 tablet, Rfl: 2   Past Medical History:   Diagnosis Date    Abnormal Pap smear of cervix     Cervical dysplasia     H. pylori infection     HPV (human papilloma virus) infection     Hyperlipidemia     Hypertension 2023    LGSIL Pap smear of vagina     Migraine     Obesity         Physical Exam  Vitals " reviewed.   Constitutional:       Appearance: Normal appearance. She is well-developed. She is obese.   Neck:      Thyroid: No thyroid mass, thyromegaly or thyroid tenderness.   Cardiovascular:      Rate and Rhythm: Normal rate and regular rhythm.      Heart sounds: No murmur heard.     No friction rub. No gallop.   Pulmonary:      Effort: Pulmonary effort is normal.      Breath sounds: Normal breath sounds. No wheezing or rhonchi.   Lymphadenopathy:      Cervical: No cervical adenopathy.   Skin:     General: Skin is warm and dry.   Neurological:      Mental Status: She is alert and oriented to person, place, and time.      Cranial Nerves: No cranial nerve deficit.   Psychiatric:         Mood and Affect: Mood and affect normal.         Behavior: Behavior normal.         Thought Content: Thought content normal. Thought content does not include homicidal or suicidal ideation.         Judgment: Judgment normal.          Result Review :    The following data was reviewed by: SERA Read on 11/04/2024:  Common Labs   Common labs          12/12/2023    07:26 2/27/2024    08:28 7/25/2024    07:40   Common Labs   Glucose 90  81  80    BUN 23  15  22    Creatinine 1.15  0.99  1.14    Sodium 139  140  137    Potassium 4.2  4.1  4.1    Chloride 103  106  102    Calcium 9.3  9.1  9.1    Albumin 4.1  3.9  4.2    Total Bilirubin 0.3  0.2  0.4    Alkaline Phosphatase 41  45  38    AST (SGOT) 22  15  25    ALT (SGPT) 18  18  28    WBC  9.81  9.67    Hemoglobin  13.0  13.2    Hematocrit  40.3  40.5    Platelets  379  392    Total Cholesterol 199  212  130    Triglycerides 175  275  117    HDL Cholesterol 44  39  43    LDL Cholesterol  124  124  66      Assessment & Plan  Class 2 severe obesity with serious comorbidity and body mass index (BMI) of 36.0 to 36.9 in adult, unspecified obesity type  Patient's (Body mass index is 36.78 kg/m².) indicates that they are morbidly/severely obese (BMI > 40 or > 35 with obesity -  related health condition) with health conditions that include hypertension and dyslipidemias . Weight is improving with treatment. BMI  is above average; BMI management plan is completed. We discussed portion control, increasing exercise, and pharmacologic options including continuing phentermine .  She does not need a refill on phentermine today since she just received a refill on 10/24/2024.  She will notify me a few days before her refill is needed.  We discussed that she will get 1 more refill and then she will take a break until she follows up with me on 1/20/2025.  Manual Cam reviewed and is consistent.       Mixed hyperlipidemia  Cholesterol is stable on fenofibrate and simvastatin as listed, will continue current doses.  Will plan to recheck fasting lipid panel in January.    Orders:    fenofibrate 160 MG tablet; Take 1 tablet by mouth Every Night. Indications: Elevation of Both Cholesterol and Triglycerides in Blood    Generalized anxiety disorder  Anxiety not well-controlled on Celexa, will change to Trintellix 10 mg daily.  Patient will follow-up with me in January as scheduled.    Orders:    Vortioxetine HBr (Trintellix) 10 MG tablet tablet; Take 1 tablet by mouth Daily With Breakfast. Indications: anxiety and difficulty concentrating    Difficulty concentrating  Patient is having difficulty concentrating and does not feel that Wellbutrin is helping enough.  We discussed other options and she would like to try a different antidepressant.  I am going to start her on Trintellix 10 mg daily and she will stop Celexa the day before she starts Trintellix.  Orders:    Vortioxetine HBr (Trintellix) 10 MG tablet tablet; Take 1 tablet by mouth Daily With Breakfast. Indications: anxiety and difficulty concentrating    Need for influenza vaccination  Flu shot given in the office today.  Orders:    Fluzone >6mos ([id number])       Diagnosis Plan   1. Class 2 severe obesity with serious comorbidity and body mass  index (BMI) of 36.0 to 36.9 in adult, unspecified obesity type        2. Mixed hyperlipidemia  fenofibrate 160 MG tablet      3. Generalized anxiety disorder  Vortioxetine HBr (Trintellix) 10 MG tablet tablet      4. Difficulty concentrating  Vortioxetine HBr (Trintellix) 10 MG tablet tablet      5. Need for influenza vaccination  Fluzone >6mos ([id number])            FOLLOW UP  Return for Keep Scheduled Follow-up.  Patient was given instructions and counseling regarding her condition or for health maintenance advice. Please see specific information pulled into the AVS if appropriate.       CURRENT & DISCONTINUED MEDICATIONS  Current Outpatient Medications   Medication Instructions    amLODIPine (NORVASC) 5 mg, Oral, Daily    buPROPion XL (WELLBUTRIN XL) 300 mg, Oral, Every Morning    estradiol (ESTRACE) 2 mg, Oral, Daily    etodolac (LODINE) 500 mg, Oral, 2 Times Daily    fenofibrate 160 mg, Oral, Nightly    phentermine 30 mg, Oral, Every Morning    simvastatin (ZOCOR) 40 mg, Oral, Nightly    traZODone (DESYREL) 50 mg, Oral, Nightly PRN    Vortioxetine HBr (TRINTELLIX) 10 mg, Oral, Daily With Breakfast       Medications Discontinued During This Encounter   Medication Reason    fenofibrate 160 MG tablet Reorder    etodolac (LODINE) 500 MG tablet Reorder    citalopram (CeleXA) 40 MG tablet Not Efficacious    baclofen (LIORESAL) 20 MG tablet Side effects        Parts of this note are electronic transcriptions/translations of spoken language to printed text using the Dragon Dictation system.    Macarena Carter, SERA  11/04/24  12:59 EST

## 2024-11-04 ENCOUNTER — OFFICE VISIT (OUTPATIENT)
Dept: FAMILY MEDICINE CLINIC | Facility: CLINIC | Age: 44
End: 2024-11-04
Payer: COMMERCIAL

## 2024-11-04 VITALS
WEIGHT: 221 LBS | SYSTOLIC BLOOD PRESSURE: 144 MMHG | BODY MASS INDEX: 36.82 KG/M2 | TEMPERATURE: 97.8 F | OXYGEN SATURATION: 98 % | HEART RATE: 84 BPM | HEIGHT: 65 IN | DIASTOLIC BLOOD PRESSURE: 92 MMHG

## 2024-11-04 DIAGNOSIS — E78.2 MIXED HYPERLIPIDEMIA: Chronic | ICD-10-CM

## 2024-11-04 DIAGNOSIS — R41.840 DIFFICULTY CONCENTRATING: ICD-10-CM

## 2024-11-04 DIAGNOSIS — E66.812 CLASS 2 SEVERE OBESITY WITH SERIOUS COMORBIDITY AND BODY MASS INDEX (BMI) OF 36.0 TO 36.9 IN ADULT, UNSPECIFIED OBESITY TYPE: Primary | ICD-10-CM

## 2024-11-04 DIAGNOSIS — Z23 NEED FOR INFLUENZA VACCINATION: ICD-10-CM

## 2024-11-04 DIAGNOSIS — E66.01 CLASS 2 SEVERE OBESITY WITH SERIOUS COMORBIDITY AND BODY MASS INDEX (BMI) OF 36.0 TO 36.9 IN ADULT, UNSPECIFIED OBESITY TYPE: Primary | ICD-10-CM

## 2024-11-04 DIAGNOSIS — F41.1 GENERALIZED ANXIETY DISORDER: ICD-10-CM

## 2024-11-04 PROCEDURE — 90471 IMMUNIZATION ADMIN: CPT | Performed by: NURSE PRACTITIONER

## 2024-11-04 PROCEDURE — 99214 OFFICE O/P EST MOD 30 MIN: CPT | Performed by: NURSE PRACTITIONER

## 2024-11-04 PROCEDURE — 90656 IIV3 VACC NO PRSV 0.5 ML IM: CPT | Performed by: NURSE PRACTITIONER

## 2024-11-04 RX ORDER — PHENTERMINE HYDROCHLORIDE 30 MG/1
30 CAPSULE ORAL EVERY MORNING
Qty: 30 CAPSULE | Refills: 0 | Status: CANCELLED | OUTPATIENT
Start: 2024-11-04

## 2024-11-04 RX ORDER — ETODOLAC 500 MG/1
500 TABLET, FILM COATED ORAL 2 TIMES DAILY
Qty: 60 TABLET | Refills: 2 | Status: SHIPPED | OUTPATIENT
Start: 2024-11-04

## 2024-11-04 RX ORDER — FENOFIBRATE 160 MG/1
160 TABLET ORAL NIGHTLY
Qty: 90 TABLET | Refills: 1 | Status: SHIPPED | OUTPATIENT
Start: 2024-11-04

## 2024-11-04 NOTE — ASSESSMENT & PLAN NOTE
Cholesterol is stable on fenofibrate and simvastatin as listed, will continue current doses.  Will plan to recheck fasting lipid panel in January.    Orders:    fenofibrate 160 MG tablet; Take 1 tablet by mouth Every Night. Indications: Elevation of Both Cholesterol and Triglycerides in Blood

## 2024-11-04 NOTE — ASSESSMENT & PLAN NOTE
Anxiety not well-controlled on Celexa, will change to Trintellix 10 mg daily.  Patient will follow-up with me in January as scheduled.    Orders:    Vortioxetine HBr (Trintellix) 10 MG tablet tablet; Take 1 tablet by mouth Daily With Breakfast. Indications: anxiety and difficulty concentrating

## 2024-11-04 NOTE — ASSESSMENT & PLAN NOTE
Patient is having difficulty concentrating and does not feel that Wellbutrin is helping enough.  We discussed other options and she would like to try a different antidepressant.  I am going to start her on Trintellix 10 mg daily and she will stop Celexa the day before she starts Trintellix.  Orders:    Vortioxetine HBr (Trintellix) 10 MG tablet tablet; Take 1 tablet by mouth Daily With Breakfast. Indications: anxiety and difficulty concentrating

## 2024-11-06 DIAGNOSIS — M79.604 PAIN AND SWELLING OF LOWER EXTREMITY, RIGHT: ICD-10-CM

## 2024-11-06 DIAGNOSIS — M79.89 PAIN AND SWELLING OF LOWER EXTREMITY, RIGHT: ICD-10-CM

## 2024-11-06 RX ORDER — METHOCARBAMOL 750 MG/1
TABLET, FILM COATED ORAL
Qty: 60 TABLET | Refills: 0 | Status: SHIPPED | OUTPATIENT
Start: 2024-11-06

## 2024-11-27 DIAGNOSIS — F41.1 GENERALIZED ANXIETY DISORDER: Primary | ICD-10-CM

## 2024-11-27 RX ORDER — DESVENLAFAXINE 50 MG/1
50 TABLET, FILM COATED, EXTENDED RELEASE ORAL DAILY
Qty: 30 TABLET | Refills: 2 | Status: SHIPPED | OUTPATIENT
Start: 2024-11-27

## 2025-01-20 ENCOUNTER — OFFICE VISIT (OUTPATIENT)
Dept: FAMILY MEDICINE CLINIC | Facility: CLINIC | Age: 45
End: 2025-01-20
Payer: COMMERCIAL

## 2025-01-20 VITALS
WEIGHT: 228.9 LBS | BODY MASS INDEX: 38.14 KG/M2 | HEART RATE: 68 BPM | OXYGEN SATURATION: 99 % | HEIGHT: 65 IN | TEMPERATURE: 96.5 F | DIASTOLIC BLOOD PRESSURE: 78 MMHG | SYSTOLIC BLOOD PRESSURE: 138 MMHG

## 2025-01-20 DIAGNOSIS — I10 PRIMARY HYPERTENSION: ICD-10-CM

## 2025-01-20 DIAGNOSIS — E66.01 CLASS 2 SEVERE OBESITY WITH SERIOUS COMORBIDITY AND BODY MASS INDEX (BMI) OF 38.0 TO 38.9 IN ADULT, UNSPECIFIED OBESITY TYPE: ICD-10-CM

## 2025-01-20 DIAGNOSIS — M79.89 PAIN AND SWELLING OF LOWER EXTREMITY, RIGHT: ICD-10-CM

## 2025-01-20 DIAGNOSIS — M72.2 PLANTAR FASCIITIS, LEFT: Primary | ICD-10-CM

## 2025-01-20 DIAGNOSIS — G47.00 INSOMNIA, UNSPECIFIED TYPE: ICD-10-CM

## 2025-01-20 DIAGNOSIS — E66.812 CLASS 2 SEVERE OBESITY WITH SERIOUS COMORBIDITY AND BODY MASS INDEX (BMI) OF 38.0 TO 38.9 IN ADULT, UNSPECIFIED OBESITY TYPE: ICD-10-CM

## 2025-01-20 DIAGNOSIS — E78.2 MIXED HYPERLIPIDEMIA: Chronic | ICD-10-CM

## 2025-01-20 DIAGNOSIS — M79.604 PAIN AND SWELLING OF LOWER EXTREMITY, RIGHT: ICD-10-CM

## 2025-01-20 LAB
ALBUMIN SERPL-MCNC: 3.5 G/DL (ref 3.5–5.2)
ALBUMIN/GLOB SERPL: 1.2 G/DL
ALP SERPL-CCNC: 46 U/L (ref 39–117)
ALT SERPL W P-5'-P-CCNC: 20 U/L (ref 1–33)
ANION GAP SERPL CALCULATED.3IONS-SCNC: 11.2 MMOL/L (ref 5–15)
AST SERPL-CCNC: 23 U/L (ref 1–32)
BILIRUB SERPL-MCNC: 0.2 MG/DL (ref 0–1.2)
BUN SERPL-MCNC: 14 MG/DL (ref 6–20)
BUN/CREAT SERPL: 16.3 (ref 7–25)
CALCIUM SPEC-SCNC: 9.6 MG/DL (ref 8.6–10.5)
CHLORIDE SERPL-SCNC: 106 MMOL/L (ref 98–107)
CHOLEST SERPL-MCNC: 202 MG/DL (ref 0–200)
CO2 SERPL-SCNC: 23.8 MMOL/L (ref 22–29)
CREAT SERPL-MCNC: 0.86 MG/DL (ref 0.57–1)
EGFRCR SERPLBLD CKD-EPI 2021: 84.5 ML/MIN/1.73
GLOBULIN UR ELPH-MCNC: 3 GM/DL
GLUCOSE SERPL-MCNC: 88 MG/DL (ref 65–99)
HDLC SERPL-MCNC: 52 MG/DL (ref 40–60)
LDLC SERPL CALC-MCNC: 122 MG/DL (ref 0–100)
LDLC/HDLC SERPL: 2.28 {RATIO}
POTASSIUM SERPL-SCNC: 5 MMOL/L (ref 3.5–5.2)
PROT SERPL-MCNC: 6.5 G/DL (ref 6–8.5)
SODIUM SERPL-SCNC: 141 MMOL/L (ref 136–145)
TRIGL SERPL-MCNC: 158 MG/DL (ref 0–150)
VLDLC SERPL-MCNC: 28 MG/DL (ref 5–40)

## 2025-01-20 PROCEDURE — 80061 LIPID PANEL: CPT | Performed by: NURSE PRACTITIONER

## 2025-01-20 PROCEDURE — 80053 COMPREHEN METABOLIC PANEL: CPT | Performed by: NURSE PRACTITIONER

## 2025-01-20 PROCEDURE — 99214 OFFICE O/P EST MOD 30 MIN: CPT | Performed by: NURSE PRACTITIONER

## 2025-01-20 RX ORDER — PHENTERMINE HYDROCHLORIDE 30 MG/1
30 CAPSULE ORAL EVERY MORNING
Qty: 30 CAPSULE | Refills: 0 | Status: CANCELLED | OUTPATIENT
Start: 2025-01-20

## 2025-01-20 RX ORDER — AMLODIPINE BESYLATE 5 MG/1
5 TABLET ORAL DAILY
Qty: 90 TABLET | Refills: 1 | Status: SHIPPED | OUTPATIENT
Start: 2025-01-20

## 2025-01-20 RX ORDER — METHOCARBAMOL 750 MG/1
750 TABLET, FILM COATED ORAL NIGHTLY PRN
Qty: 30 TABLET | Refills: 2 | Status: SHIPPED | OUTPATIENT
Start: 2025-01-20

## 2025-01-20 RX ORDER — TRAZODONE HYDROCHLORIDE 50 MG/1
50 TABLET, FILM COATED ORAL NIGHTLY PRN
Qty: 90 TABLET | Refills: 1 | Status: SHIPPED | OUTPATIENT
Start: 2025-01-20

## 2025-01-20 RX ORDER — SIMVASTATIN 40 MG
40 TABLET ORAL NIGHTLY
Qty: 90 TABLET | Refills: 1 | Status: CANCELLED | OUTPATIENT
Start: 2025-01-20

## 2025-01-20 RX ORDER — PHENTERMINE HYDROCHLORIDE 37.5 MG/1
37.5 TABLET ORAL
Qty: 30 TABLET | Refills: 0 | Status: SHIPPED | OUTPATIENT
Start: 2025-01-20

## 2025-01-20 RX ORDER — DICLOFENAC SODIUM 75 MG/1
75 TABLET, DELAYED RELEASE ORAL 2 TIMES DAILY
Qty: 60 TABLET | Refills: 2 | Status: SHIPPED | OUTPATIENT
Start: 2025-01-20

## 2025-01-20 RX ORDER — ETODOLAC 500 MG/1
500 TABLET, FILM COATED ORAL 2 TIMES DAILY
Qty: 60 TABLET | Refills: 2 | Status: CANCELLED | OUTPATIENT
Start: 2025-01-20

## 2025-01-20 NOTE — ASSESSMENT & PLAN NOTE
Blood pressure stable on amlodipine 5 mg daily, will continue current dose.    Orders:    amLODIPine (NORVASC) 5 MG tablet; Take 1 tablet by mouth Daily. Indications: High Blood Pressure    Comprehensive Metabolic Panel    Lipid Panel

## 2025-01-20 NOTE — ASSESSMENT & PLAN NOTE
Insomnia stable, takes trazodone 50 mg at bedtime only as needed.  Will continue current dose.  Orders:    traZODone (DESYREL) 50 MG tablet; Take 1 tablet by mouth At Night As Needed for Sleep. Indications: Trouble Sleeping

## 2025-01-20 NOTE — ASSESSMENT & PLAN NOTE
Hyperlipidemia has been stable on simvastatin and fenofibrate.  She will continue medications as prescribed.  She is working on healthy lifestyle changes and exercise.  Fasting lipid panel today.    Orders:    Comprehensive Metabolic Panel    Lipid Panel

## 2025-01-20 NOTE — ASSESSMENT & PLAN NOTE
We discussed exercises for plantar fasciitis and using ice as needed.  I will start her on diclofenac 75 mg twice daily and advised her to stop etodolac completely.  Orders:    diclofenac (VOLTAREN) 75 MG EC tablet; Take 1 tablet by mouth 2 (Two) Times a Day. Indications: plantar fascitis

## 2025-01-20 NOTE — ASSESSMENT & PLAN NOTE
Patient's (Body mass index is 38.09 kg/m².) indicates that they are morbidly obese (BMI > 40 or > 35 with obesity - related health condition) with health related conditions that include hypertension, dyslipidemias, and osteoarthritis . Weight is worsening. BMI is is above average; BMI management plan is completed. We discussed low calorie, low carb based diet program, portion control, increasing exercise, pharmacologic options including restarting phentermine, and Information on healthy weight added to patient's after visit summary.   Cam was reviewed and is consistent.  I will prescribe her phentermine 37.5 mg daily.  She will follow-up with me in 4 weeks.    Orders:    phentermine (ADIPEX-P) 37.5 MG tablet; Take 1 tablet by mouth Every Morning Before Breakfast. Indications: OBESITY    Vitamin D,25-Hydroxy

## 2025-01-20 NOTE — PROGRESS NOTES
Chief Complaint  Hypertension, Hyperlipidemia, Plantar Fasciitis (Left foot ), and Obesity (Would like to start back on weight meds )    Adam Bynum is a 45 y.o. female who presents to [facility]   History of Present Illness  6-month follow-up.    She is complaining of left plantar foot pain.  She states she was seeing the chiropractor who told her that that is what this was.  He recommended some exercises and to start diclofenac.  She has etodolac that she takes occasionally for her leg pain but she states she took it for the Planter fasciitis and it did not help.    Body mass index is 38.09 kg/m².  She has been on phentermine in the past.  She has been off of it for several months.  She has gained weight, 7 pounds in the last 3 months.  She states the holidays has been bad and plus her injuries that she has had has prevented her from exercising.  She is getting back into the gym again.  She was on phentermine 30 mg but she states she feels she needs to increase the dose.  She states she has done better on the phentermine 37.5 mg tablets in the past.    Hypertension: Her blood pressure is stable on amlodipine 5 mg daily.    Hyperlipidemia: She is stable on fenofibrate 160 mg and simvastatin 40 mg every evening.    Generalized anxiety disorder/difficulty concentrating: She states she has stopped taking Wellbutrin and duloxetine.  She states she feels like she is doing fine without them.  She does still take trazodone as needed to help her sleep.    PHQ-2 Depression Screening  Little interest or pleasure in doing things? Not at all   Feeling down, depressed, or hopeless? Not at all   PHQ-2 Total Score 0       Tobacco Use: Medium Risk (1/20/2025)    Patient History     Smoking Tobacco Use: Former     Smokeless Tobacco Use: Never     Passive Exposure: Not on file      E-cigarette/Vaping    E-cigarette/Vaping Use Never User      E-cigarette/Vaping Substances  "    E-cigarette/Vaping Devices       Alcohol Use: Not on file         Objective   Vital Signs:   Vitals:    01/20/25 0745 01/20/25 0752   BP: 145/80 138/78   BP Location: Left arm    Patient Position: Sitting    Cuff Size: Adult    Pulse: 68    Temp: 96.5 °F (35.8 °C)    SpO2: 99%    Weight: 104 kg (228 lb 14.4 oz)    Height: 165.1 cm (65\")    PainSc: 0-No pain      Body mass index is 38.09 kg/m².    Wt Readings from Last 3 Encounters:   01/20/25 104 kg (228 lb 14.4 oz)   11/04/24 100 kg (221 lb)   09/26/24 106 kg (232 lb 9.6 oz)     BP Readings from Last 3 Encounters:   01/20/25 138/78   11/04/24 144/92   09/26/24 126/77       Health Maintenance   Topic Date Due    COLORECTAL CANCER SCREENING  Never done    COVID-19 Vaccine (4 - 2024-25 season) 11/04/2025 (Originally 9/1/2024)    ANNUAL PHYSICAL  05/30/2025    LIPID PANEL  07/25/2025    BMI FOLLOWUP  01/20/2026    MAMMOGRAM  06/04/2026    TDAP/TD VACCINES (2 - Td or Tdap) 07/18/2033    INFLUENZA VACCINE  Completed    Pneumococcal Vaccine 0-64  Aged Out    HEPATITIS C SCREENING  Discontinued       /78   Pulse 68   Temp 96.5 °F (35.8 °C)   Ht 165.1 cm (65\")   Wt 104 kg (228 lb 14.4 oz)   SpO2 99%   BMI 38.09 kg/m²       Current Outpatient Medications:     amLODIPine (NORVASC) 5 MG tablet, Take 1 tablet by mouth Daily. Indications: High Blood Pressure, Disp: 90 tablet, Rfl: 1    estradiol (Estrace) 2 MG tablet, Take 1 tablet by mouth Daily., Disp: 90 tablet, Rfl: 3    fenofibrate 160 MG tablet, Take 1 tablet by mouth Every Night. Indications: Elevation of Both Cholesterol and Triglycerides in Blood, Disp: 90 tablet, Rfl: 1    methocarbamol (ROBAXIN) 750 MG tablet, Take 1 tablet by mouth At Night As Needed for Muscle Spasms., Disp: 30 tablet, Rfl: 2    simvastatin (ZOCOR) 40 MG tablet, Take 1 tablet by mouth Every Night. Indications: High Amount of Fats in the Blood, Disp: 90 tablet, Rfl: 1    traZODone (DESYREL) 50 MG tablet, Take 1 tablet by mouth At " Night As Needed for Sleep. Indications: Trouble Sleeping, Disp: 90 tablet, Rfl: 1    diclofenac (VOLTAREN) 75 MG EC tablet, Take 1 tablet by mouth 2 (Two) Times a Day. Indications: plantar fascitis, Disp: 60 tablet, Rfl: 2    phentermine (ADIPEX-P) 37.5 MG tablet, Take 1 tablet by mouth Every Morning Before Breakfast. Indications: OBESITY, Disp: 30 tablet, Rfl: 0   Past Medical History:   Diagnosis Date    Abnormal Pap smear of cervix     Cervical dysplasia     H. pylori infection     HPV (human papilloma virus) infection     Hyperlipidemia     Hypertension 2023    LGSIL Pap smear of vagina     Migraine     Obesity         Physical Exam  Vitals reviewed.   Constitutional:       Appearance: Normal appearance. She is well-developed. She is obese.   Neck:      Thyroid: No thyroid mass, thyromegaly or thyroid tenderness.   Cardiovascular:      Rate and Rhythm: Normal rate and regular rhythm.      Heart sounds: No murmur heard.     No friction rub. No gallop.   Pulmonary:      Effort: Pulmonary effort is normal.      Breath sounds: Normal breath sounds. No wheezing or rhonchi.   Lymphadenopathy:      Cervical: No cervical adenopathy.   Skin:     General: Skin is warm and dry.   Neurological:      Mental Status: She is alert and oriented to person, place, and time.      Cranial Nerves: No cranial nerve deficit.   Psychiatric:         Mood and Affect: Mood and affect normal.         Behavior: Behavior normal.         Thought Content: Thought content normal. Thought content does not include homicidal or suicidal ideation.         Judgment: Judgment normal.          Result Review :    The following data was reviewed by: SERA Read on 01/20/2025:  Common Labs   Common labs          2/27/2024    08:28 7/25/2024    07:40   Common Labs   Glucose 81  80    BUN 15  22    Creatinine 0.99  1.14    Sodium 140  137    Potassium 4.1  4.1    Chloride 106  102    Calcium 9.1  9.1    Albumin 3.9  4.2    Total Bilirubin 0.2   0.4    Alkaline Phosphatase 45  38    AST (SGOT) 15  25    ALT (SGPT) 18  28    WBC 9.81  9.67    Hemoglobin 13.0  13.2    Hematocrit 40.3  40.5    Platelets 379  392    Total Cholesterol 212  130    Triglycerides 275  117    HDL Cholesterol 39  43    LDL Cholesterol  124  66        Assessment & Plan  Plantar fasciitis, left  We discussed exercises for plantar fasciitis and using ice as needed.  I will start her on diclofenac 75 mg twice daily and advised her to stop etodolac completely.  Orders:    diclofenac (VOLTAREN) 75 MG EC tablet; Take 1 tablet by mouth 2 (Two) Times a Day. Indications: plantar fascitis    Primary hypertension  Blood pressure stable on amlodipine 5 mg daily, will continue current dose.    Orders:    amLODIPine (NORVASC) 5 MG tablet; Take 1 tablet by mouth Daily. Indications: High Blood Pressure    Comprehensive Metabolic Panel    Lipid Panel    Class 2 severe obesity with serious comorbidity and body mass index (BMI) of 38.0 to 38.9 in adult, unspecified obesity type  Patient's (Body mass index is 38.09 kg/m².) indicates that they are morbidly obese (BMI > 40 or > 35 with obesity - related health condition) with health related conditions that include hypertension, dyslipidemias, and osteoarthritis . Weight is worsening. BMI is is above average; BMI management plan is completed. We discussed low calorie, low carb based diet program, portion control, increasing exercise, pharmacologic options including restarting phentermine, and Information on healthy weight added to patient's after visit summary.   Cam was reviewed and is consistent.  I will prescribe her phentermine 37.5 mg daily.  She will follow-up with me in 4 weeks.    Orders:    phentermine (ADIPEX-P) 37.5 MG tablet; Take 1 tablet by mouth Every Morning Before Breakfast. Indications: OBESITY    Vitamin D,25-Hydroxy    Mixed hyperlipidemia  Hyperlipidemia has been stable on simvastatin and fenofibrate.  She will continue medications  as prescribed.  She is working on healthy lifestyle changes and exercise.  Fasting lipid panel today.    Orders:    Comprehensive Metabolic Panel    Lipid Panel    Pain and swelling of lower extremity, right  She still has occasional pain in her right lower extremity if she has been exercising more.  She takes the methocarbamol mostly at night as needed.  Orders:    methocarbamol (ROBAXIN) 750 MG tablet; Take 1 tablet by mouth At Night As Needed for Muscle Spasms.    Insomnia, unspecified type  Insomnia stable, takes trazodone 50 mg at bedtime only as needed.  Will continue current dose.  Orders:    traZODone (DESYREL) 50 MG tablet; Take 1 tablet by mouth At Night As Needed for Sleep. Indications: Trouble Sleeping          Diagnosis Plan   1. Plantar fasciitis, left  diclofenac (VOLTAREN) 75 MG EC tablet      2. Primary hypertension  amLODIPine (NORVASC) 5 MG tablet    Comprehensive Metabolic Panel    Lipid Panel      3. Class 2 severe obesity with serious comorbidity and body mass index (BMI) of 38.0 to 38.9 in adult, unspecified obesity type  phentermine (ADIPEX-P) 37.5 MG tablet    Vitamin D,25-Hydroxy      4. Mixed hyperlipidemia  Comprehensive Metabolic Panel    Lipid Panel      5. Pain and swelling of lower extremity, right  methocarbamol (ROBAXIN) 750 MG tablet      6. Insomnia, unspecified type  traZODone (DESYREL) 50 MG tablet            FOLLOW UP  Return in about 4 weeks (around 2/17/2025) for Recheck weight loss mgmt.  Patient was given instructions and counseling regarding her condition or for health maintenance advice. Please see specific information pulled into the AVS if appropriate.       CURRENT & DISCONTINUED MEDICATIONS  Current Outpatient Medications   Medication Instructions    amLODIPine (NORVASC) 5 mg, Oral, Daily    diclofenac (VOLTAREN) 75 mg, Oral, 2 Times Daily    estradiol (ESTRACE) 2 mg, Oral, Daily    fenofibrate 160 mg, Oral, Nightly    methocarbamol (ROBAXIN) 750 mg, Oral, Nightly PRN     phentermine (ADIPEX-P) 37.5 mg, Oral, Every Morning Before Breakfast    simvastatin (ZOCOR) 40 mg, Oral, Nightly    traZODone (DESYREL) 50 mg, Oral, Nightly PRN       Medications Discontinued During This Encounter   Medication Reason    etodolac (LODINE) 500 MG tablet Not Efficacious    buPROPion XL (WELLBUTRIN XL) 300 MG 24 hr tablet *Therapy completed    desvenlafaxine (PRISTIQ) 50 MG 24 hr tablet Cost of medication    DULoxetine (CYMBALTA) 30 MG capsule *Therapy completed    phentermine 30 MG capsule Dose adjustment    amLODIPine (NORVASC) 5 MG tablet Reorder    traZODone (DESYREL) 50 MG tablet Reorder    methocarbamol (ROBAXIN) 750 MG tablet Reorder        Parts of this note are electronic transcriptions/translations of spoken language to printed text using the Dragon Dictation system.    Macarena Carter, APRN  01/20/25  08:17 EST

## 2025-01-20 NOTE — ASSESSMENT & PLAN NOTE
She still has occasional pain in her right lower extremity if she has been exercising more.  She takes the methocarbamol mostly at night as needed.  Orders:    methocarbamol (ROBAXIN) 750 MG tablet; Take 1 tablet by mouth At Night As Needed for Muscle Spasms.

## 2025-01-22 ENCOUNTER — LAB (OUTPATIENT)
Dept: FAMILY MEDICINE CLINIC | Facility: CLINIC | Age: 45
End: 2025-01-22
Payer: COMMERCIAL

## 2025-01-22 DIAGNOSIS — M79.604 PAIN AND SWELLING OF LOWER EXTREMITY, RIGHT: ICD-10-CM

## 2025-01-22 DIAGNOSIS — I10 PRIMARY HYPERTENSION: ICD-10-CM

## 2025-01-22 DIAGNOSIS — E66.01 CLASS 2 SEVERE OBESITY WITH SERIOUS COMORBIDITY AND BODY MASS INDEX (BMI) OF 38.0 TO 38.9 IN ADULT, UNSPECIFIED OBESITY TYPE: ICD-10-CM

## 2025-01-22 DIAGNOSIS — G47.00 INSOMNIA, UNSPECIFIED TYPE: ICD-10-CM

## 2025-01-22 DIAGNOSIS — M79.89 PAIN AND SWELLING OF LOWER EXTREMITY, RIGHT: ICD-10-CM

## 2025-01-22 DIAGNOSIS — E78.2 MIXED HYPERLIPIDEMIA: Chronic | ICD-10-CM

## 2025-01-22 DIAGNOSIS — M72.2 PLANTAR FASCIITIS, LEFT: ICD-10-CM

## 2025-01-22 DIAGNOSIS — E66.812 CLASS 2 SEVERE OBESITY WITH SERIOUS COMORBIDITY AND BODY MASS INDEX (BMI) OF 38.0 TO 38.9 IN ADULT, UNSPECIFIED OBESITY TYPE: ICD-10-CM

## 2025-01-22 LAB — 25(OH)D3 SERPL-MCNC: 56 NG/ML (ref 30–100)

## 2025-01-22 PROCEDURE — 36415 COLL VENOUS BLD VENIPUNCTURE: CPT | Performed by: NURSE PRACTITIONER

## 2025-01-22 PROCEDURE — 82306 VITAMIN D 25 HYDROXY: CPT | Performed by: NURSE PRACTITIONER

## 2025-01-26 DIAGNOSIS — I10 PRIMARY HYPERTENSION: ICD-10-CM

## 2025-01-26 DIAGNOSIS — R41.840 DIFFICULTY CONCENTRATING: ICD-10-CM

## 2025-01-27 RX ORDER — BUPROPION HYDROCHLORIDE 300 MG/1
300 TABLET ORAL EVERY MORNING
Qty: 90 TABLET | Refills: 1 | OUTPATIENT
Start: 2025-01-27

## 2025-01-27 RX ORDER — AMLODIPINE BESYLATE 5 MG/1
5 TABLET ORAL DAILY
Qty: 90 TABLET | Refills: 1 | OUTPATIENT
Start: 2025-01-27

## 2025-02-03 DIAGNOSIS — G47.00 INSOMNIA, UNSPECIFIED TYPE: ICD-10-CM

## 2025-02-03 RX ORDER — TRAZODONE HYDROCHLORIDE 50 MG/1
50 TABLET, FILM COATED ORAL NIGHTLY PRN
Qty: 90 TABLET | Refills: 1 | OUTPATIENT
Start: 2025-02-03

## 2025-02-16 NOTE — PROGRESS NOTES
"Answers submitted by the patient for this visit:  Weight Management (Submitted on 2/14/2025)  Chief Complaint: Weight Management  Weight: decreased  Weight loss treatment: increasing exercise, prescription medications  Eating habit changes: Reduced appetite  Energy level: increased  Physical activity tolerance: improved  Chief Complaint  Weight Check, Anxiety, and Hypertension    Subjective            Natalya Bynum is a 45 y.o. female who presents to [facility]   History of Present Illness  1 month follow-up on weight loss management.  We restarted phentermine 37.5 mg last month.  She did lose 6 pounds since her last visit.  She is tolerating medication well.    Anxiety: she states restarted Celexa 40 mg daily.  She states she was starting to feel anxious and irritable without it so she restarted Celexa.    Hypertension: Her blood pressure is stable on amlodipine 5 mg daily.    Tobacco Use: Medium Risk (2/17/2025)    Patient History     Smoking Tobacco Use: Former     Smokeless Tobacco Use: Never     Passive Exposure: Not on file      E-cigarette/Vaping    E-cigarette/Vaping Use Never User      E-cigarette/Vaping Substances     E-cigarette/Vaping Devices       Alcohol Use: Not on file         Objective   Vital Signs:   Vitals:    02/17/25 0855 02/17/25 0913   BP: 132/90 110/80   BP Location: Left arm    Patient Position: Sitting    Cuff Size: Adult    Pulse: 79    Temp: 96.7 °F (35.9 °C)    SpO2: 99%    Weight: 101 kg (222 lb 11.2 oz)    Height: 165.1 cm (65\")    PainSc: 0-No pain      Body mass index is 37.06 kg/m².    Wt Readings from Last 3 Encounters:   02/17/25 101 kg (222 lb 11.2 oz)   01/20/25 104 kg (228 lb 14.4 oz)   11/04/24 100 kg (221 lb)     BP Readings from Last 3 Encounters:   02/17/25 110/80   01/20/25 138/78   11/04/24 144/92       Health Maintenance   Topic Date Due    COLORECTAL CANCER SCREENING  Never done    COVID-19 Vaccine (4 - 2024-25 season) 11/04/2025 " "(Originally 9/1/2024)    ANNUAL PHYSICAL  05/30/2025    LIPID PANEL  01/20/2026    BMI FOLLOWUP  02/17/2026    MAMMOGRAM  06/04/2026    TDAP/TD VACCINES (2 - Td or Tdap) 07/18/2033    INFLUENZA VACCINE  Completed    Pneumococcal Vaccine 0-49  Aged Out    HEPATITIS C SCREENING  Discontinued       /80   Pulse 79   Temp 96.7 °F (35.9 °C)   Ht 165.1 cm (65\")   Wt 101 kg (222 lb 11.2 oz)   SpO2 99%   BMI 37.06 kg/m²       Current Outpatient Medications:     amLODIPine (NORVASC) 5 MG tablet, Take 1 tablet by mouth Daily. Indications: High Blood Pressure, Disp: 90 tablet, Rfl: 1    citalopram (CeleXA) 40 MG tablet, Take 1 tablet by mouth Daily., Disp: , Rfl:     diclofenac (VOLTAREN) 75 MG EC tablet, Take 1 tablet by mouth 2 (Two) Times a Day. Indications: plantar fascitis, Disp: 60 tablet, Rfl: 2    estradiol (Estrace) 2 MG tablet, Take 1 tablet by mouth Daily., Disp: 90 tablet, Rfl: 3    fenofibrate 160 MG tablet, Take 1 tablet by mouth Every Night. Indications: Elevation of Both Cholesterol and Triglycerides in Blood, Disp: 90 tablet, Rfl: 1    methocarbamol (ROBAXIN) 750 MG tablet, Take 1 tablet by mouth At Night As Needed for Muscle Spasms., Disp: 30 tablet, Rfl: 2    simvastatin (ZOCOR) 40 MG tablet, Take 1 tablet by mouth Every Night. Indications: High Amount of Fats in the Blood, Disp: 90 tablet, Rfl: 1    traZODone (DESYREL) 50 MG tablet, Take 1 tablet by mouth At Night As Needed for Sleep. Indications: Trouble Sleeping, Disp: 90 tablet, Rfl: 1    phentermine 37.5 MG capsule, Take 1 capsule by mouth Every Morning. Indications: OBESITY, Disp: 30 capsule, Rfl: 0   Past Medical History:   Diagnosis Date    Abnormal Pap smear of cervix     Cervical dysplasia     H. pylori infection     HPV (human papilloma virus) infection     Hyperlipidemia     Hypertension 2023    LGSIL Pap smear of vagina     Migraine     Obesity         Physical Exam  Vitals reviewed.   Constitutional:       Appearance: Normal " appearance. She is well-developed. She is obese.   Neck:      Thyroid: No thyroid mass, thyromegaly or thyroid tenderness.   Cardiovascular:      Rate and Rhythm: Normal rate and regular rhythm.      Heart sounds: No murmur heard.     No friction rub. No gallop.   Pulmonary:      Effort: Pulmonary effort is normal.      Breath sounds: Normal breath sounds. No wheezing or rhonchi.   Lymphadenopathy:      Cervical: No cervical adenopathy.   Skin:     General: Skin is warm and dry.   Neurological:      Mental Status: She is alert and oriented to person, place, and time.      Cranial Nerves: No cranial nerve deficit.   Psychiatric:         Mood and Affect: Mood and affect normal.         Behavior: Behavior normal.         Thought Content: Thought content normal. Thought content does not include homicidal or suicidal ideation.         Judgment: Judgment normal.          Result Review :    The following data was reviewed by: SERA Read on 02/17/2025:  Common Labs   Common labs          2/27/2024    08:28 7/25/2024    07:40 1/20/2025    08:19   Common Labs   Glucose 81  80  88    BUN 15  22  14    Creatinine 0.99  1.14  0.86    Sodium 140  137  141    Potassium 4.1  4.1  5.0    Chloride 106  102  106    Calcium 9.1  9.1  9.6    Albumin 3.9  4.2  3.5    Total Bilirubin 0.2  0.4  0.2    Alkaline Phosphatase 45  38  46    AST (SGOT) 15  25  23    ALT (SGPT) 18  28  20    WBC 9.81  9.67     Hemoglobin 13.0  13.2     Hematocrit 40.3  40.5     Platelets 379  392     Total Cholesterol 212  130  202    Triglycerides 275  117  158    HDL Cholesterol 39  43  52    LDL Cholesterol  124  66  122      Assessment & Plan  Class 2 severe obesity with serious comorbidity and body mass index (BMI) of 37.0 to 37.9 in adult, unspecified obesity type  Patient's (Body mass index is 37.06 kg/m².) indicates that they are morbidly/severely obese (BMI > 40 or > 35 with obesity - related health condition) with health conditions that  include hypertension and dyslipidemias . Weight is improving with treatment. BMI  is above average; BMI management plan is completed. We discussed portion control, increasing exercise, and pharmacologic options including continuing phentermine .     Orders:    phentermine 37.5 MG capsule; Take 1 capsule by mouth Every Morning. Indications: OBESITY    Generalized anxiety disorder  Psychological condition is improving with treatment.  Continue current treatment regimen.  Psychological condition  will be reassessed at the next regular appointment.    Primary hypertension  Hypertension is stable and controlled  Continue current treatment regimen.  Weight loss.  Blood pressure will be reassessed in 1 month.    Screen for colon cancer  We discussed colorectal screening.  She is a low risk with no family history or personal history of colorectal cancer.  Will order Cologuard.  Orders:    Cologuard - Stool, Per Rectum; Future          Diagnosis Plan   1. Class 2 severe obesity with serious comorbidity and body mass index (BMI) of 37.0 to 37.9 in adult, unspecified obesity type  phentermine 37.5 MG capsule      2. Generalized anxiety disorder        3. Primary hypertension        4. Screen for colon cancer  Cologuard - Stool, Per Rectum            FOLLOW UP  Return in about 4 weeks (around 3/17/2025) for Recheck wt loss mgmt.  Patient was given instructions and counseling regarding her condition or for health maintenance advice. Please see specific information pulled into the AVS if appropriate.       CURRENT & DISCONTINUED MEDICATIONS  Current Outpatient Medications   Medication Instructions    amLODIPine (NORVASC) 5 mg, Oral, Daily    citalopram (CELEXA) 40 mg, Daily    diclofenac (VOLTAREN) 75 mg, Oral, 2 Times Daily    estradiol (ESTRACE) 2 mg, Oral, Daily    fenofibrate 160 mg, Oral, Nightly    methocarbamol (ROBAXIN) 750 mg, Oral, Nightly PRN    phentermine 37.5 mg, Oral, Every Morning    simvastatin (ZOCOR) 40 mg,  Oral, Nightly    traZODone (DESYREL) 50 mg, Oral, Nightly PRN       Medications Discontinued During This Encounter   Medication Reason    etodolac (LODINE) 500 MG tablet *Therapy completed    phentermine (ADIPEX-P) 37.5 MG tablet Dose adjustment        Parts of this note are electronic transcriptions/translations of spoken language to printed text using the Dragon Dictation system.    Macarena Carter, SERA  02/17/25  14:45 EST

## 2025-02-17 ENCOUNTER — OFFICE VISIT (OUTPATIENT)
Dept: FAMILY MEDICINE CLINIC | Facility: CLINIC | Age: 45
End: 2025-02-17
Payer: COMMERCIAL

## 2025-02-17 VITALS
HEIGHT: 65 IN | SYSTOLIC BLOOD PRESSURE: 110 MMHG | OXYGEN SATURATION: 99 % | TEMPERATURE: 96.7 F | WEIGHT: 222.7 LBS | BODY MASS INDEX: 37.1 KG/M2 | DIASTOLIC BLOOD PRESSURE: 80 MMHG | HEART RATE: 79 BPM

## 2025-02-17 DIAGNOSIS — F41.1 GENERALIZED ANXIETY DISORDER: ICD-10-CM

## 2025-02-17 DIAGNOSIS — Z12.11 SCREEN FOR COLON CANCER: ICD-10-CM

## 2025-02-17 DIAGNOSIS — E66.812 CLASS 2 SEVERE OBESITY WITH SERIOUS COMORBIDITY AND BODY MASS INDEX (BMI) OF 37.0 TO 37.9 IN ADULT, UNSPECIFIED OBESITY TYPE: Primary | ICD-10-CM

## 2025-02-17 DIAGNOSIS — I10 PRIMARY HYPERTENSION: ICD-10-CM

## 2025-02-17 DIAGNOSIS — E66.01 CLASS 2 SEVERE OBESITY WITH SERIOUS COMORBIDITY AND BODY MASS INDEX (BMI) OF 37.0 TO 37.9 IN ADULT, UNSPECIFIED OBESITY TYPE: Primary | ICD-10-CM

## 2025-02-17 PROCEDURE — 99214 OFFICE O/P EST MOD 30 MIN: CPT | Performed by: NURSE PRACTITIONER

## 2025-02-17 RX ORDER — PHENTERMINE HYDROCHLORIDE 37.5 MG/1
37.5 TABLET ORAL
Qty: 30 TABLET | Refills: 0 | Status: CANCELLED | OUTPATIENT
Start: 2025-02-17

## 2025-02-17 RX ORDER — ETODOLAC 500 MG/1
500 TABLET, FILM COATED ORAL 2 TIMES DAILY
COMMUNITY
Start: 2025-02-03 | End: 2025-02-17

## 2025-02-17 RX ORDER — PHENTERMINE HYDROCHLORIDE 37.5 MG/1
37.5 CAPSULE ORAL EVERY MORNING
Qty: 30 CAPSULE | Refills: 0 | Status: SHIPPED | OUTPATIENT
Start: 2025-02-17

## 2025-02-17 RX ORDER — CITALOPRAM HYDROBROMIDE 40 MG/1
40 TABLET ORAL DAILY
COMMUNITY

## 2025-02-17 NOTE — ASSESSMENT & PLAN NOTE
Patient's (Body mass index is 37.06 kg/m².) indicates that they are morbidly/severely obese (BMI > 40 or > 35 with obesity - related health condition) with health conditions that include hypertension and dyslipidemias . Weight is improving with treatment. BMI  is above average; BMI management plan is completed. We discussed portion control, increasing exercise, and pharmacologic options including continuing phentermine.     Orders:    phentermine 37.5 MG capsule; Take 1 capsule by mouth Every Morning. Indications: OBESITY

## 2025-02-17 NOTE — ASSESSMENT & PLAN NOTE
Psychological condition is improving with treatment.  Continue current treatment regimen.  Psychological condition  will be reassessed at the next regular appointment.

## 2025-02-18 DIAGNOSIS — M79.604 PAIN AND SWELLING OF LOWER EXTREMITY, RIGHT: ICD-10-CM

## 2025-02-18 DIAGNOSIS — M79.89 PAIN AND SWELLING OF LOWER EXTREMITY, RIGHT: ICD-10-CM

## 2025-02-19 DIAGNOSIS — M79.89 PAIN AND SWELLING OF LOWER EXTREMITY, RIGHT: ICD-10-CM

## 2025-02-19 DIAGNOSIS — M79.604 PAIN AND SWELLING OF LOWER EXTREMITY, RIGHT: ICD-10-CM

## 2025-02-19 RX ORDER — IBUPROFEN 800 MG/1
800 TABLET, FILM COATED ORAL EVERY 8 HOURS PRN
Qty: 90 TABLET | Refills: 0 | Status: SHIPPED | OUTPATIENT
Start: 2025-02-19 | End: 2025-02-19 | Stop reason: SDUPTHER

## 2025-02-19 RX ORDER — IBUPROFEN 800 MG/1
800 TABLET, FILM COATED ORAL EVERY 8 HOURS PRN
Qty: 90 TABLET | Refills: 0 | Status: SHIPPED | OUTPATIENT
Start: 2025-02-19

## 2025-02-19 NOTE — LETTER
March 7, 2024     Patient: Natalya Bynum   YOB: 1980   Date of Visit: 3/7/2024       To Whom It May Concern:    It is my medical opinion that Natalya Bynum may return to work in person on 3/18/2024.           Sincerely,        SERA Read    CC: No Recipients   Would recommend visit to evaluate

## 2025-02-19 NOTE — TELEPHONE ENCOUNTER
Refill sent . Patient aware not to take IBU on same day as diclofenac. Patient voiced understanding

## 2025-03-04 ENCOUNTER — OFFICE VISIT (OUTPATIENT)
Dept: OBSTETRICS AND GYNECOLOGY | Facility: CLINIC | Age: 45
End: 2025-03-04
Payer: COMMERCIAL

## 2025-03-04 VITALS
HEART RATE: 97 BPM | BODY MASS INDEX: 37.82 KG/M2 | HEIGHT: 65 IN | DIASTOLIC BLOOD PRESSURE: 80 MMHG | SYSTOLIC BLOOD PRESSURE: 119 MMHG | WEIGHT: 227 LBS

## 2025-03-04 DIAGNOSIS — Z12.11 COLON CANCER SCREENING: ICD-10-CM

## 2025-03-04 DIAGNOSIS — Z01.419 WOMEN'S ANNUAL ROUTINE GYNECOLOGICAL EXAMINATION: Primary | ICD-10-CM

## 2025-03-04 DIAGNOSIS — N95.1 MENOPAUSAL VASOMOTOR SYNDROME: ICD-10-CM

## 2025-03-04 PROCEDURE — G0123 SCREEN CERV/VAG THIN LAYER: HCPCS | Performed by: OBSTETRICS & GYNECOLOGY

## 2025-03-04 RX ORDER — ESTRADIOL 2 MG/1
2 TABLET ORAL DAILY
Qty: 90 TABLET | Refills: 3 | Status: SHIPPED | OUTPATIENT
Start: 2025-03-04

## 2025-03-04 NOTE — PROGRESS NOTES
"Well Woman Visit    CC: Well woman visit    Subjective:   45 y.o. who presents for a well woman exam.  The patient has no complaints today.  History of hysterectomy with BSO.  She reports occasional hot flashes and night sweats but overall doing very well with her estradiol.  No other problems or concerns    Last PAP:   Last Completed Pap Smear       This patient has no relevant Health Maintenance data.          Last MMG:   Last Completed Mammogram            Ordered - MAMMOGRAM (Every 2 Years) Ordered on 3/4/2025      2024  Mammo Diagnostic Digital Tomosynthesis Left With CAD    2024  Mammo Screening Digital Tomosynthesis Bilateral With CAD    2023  Mammo Screening Digital Tomosynthesis Bilateral With CAD    2022  Mammo Screening Digital Tomosynthesis Bilateral With CAD    2020  Mammo Screening Digital Tomosynthesis Bilateral With CAD    Only the first 5 history entries have been loaded, but more history exists.                     History: PMHx, Meds, Allergies, PSHx, Social Hx, and POBHx all reviewed and updated.    /80   Pulse 97   Ht 165.1 cm (65\")   Wt 103 kg (227 lb)   Breastfeeding No   BMI 37.77 kg/m²     Physical Exam  Vitals and nursing note reviewed. Exam conducted with a chaperone present.   Constitutional:       General: She is not in acute distress.     Appearance: Normal appearance. She is not ill-appearing.   HENT:      Head: Normocephalic and atraumatic.      Mouth/Throat:      Mouth: Mucous membranes are moist.      Pharynx: Oropharynx is clear.   Eyes:      Extraocular Movements: Extraocular movements intact.   Neck:      Thyroid: No thyroid mass or thyromegaly.   Chest:   Breasts:     Breasts are symmetrical.      Right: Normal. No swelling, bleeding, inverted nipple, mass, nipple discharge, skin change or tenderness.      Left: Normal. No swelling, bleeding, inverted nipple, mass, nipple discharge, skin change or tenderness.   Abdominal:      " General: There is no distension.      Palpations: Abdomen is soft. There is no mass.      Tenderness: There is no abdominal tenderness.      Hernia: There is no hernia in the left inguinal area or right inguinal area.   Genitourinary:     General: Normal vulva.      Exam position: Lithotomy position.      Pubic Area: No rash.       Labia:         Right: No rash, tenderness, lesion or injury.         Left: No rash, tenderness, lesion or injury.       Urethra: No prolapse, urethral pain or urethral lesion.      Vagina: Normal. No vaginal discharge, erythema, tenderness, bleeding or prolapsed vaginal walls.      Uterus: Absent.       Adnexa:         Right: No mass or tenderness.          Left: No mass or tenderness.     Musculoskeletal:         General: No swelling.      Right lower leg: No edema.      Left lower leg: No edema.   Lymphadenopathy:      Upper Body:      Right upper body: No supraclavicular or axillary adenopathy.      Left upper body: No supraclavicular or axillary adenopathy.   Skin:     General: Skin is warm and dry.      Findings: No rash.   Neurological:      Mental Status: She is alert and oriented to person, place, and time.   Psychiatric:         Mood and Affect: Mood normal.         Behavior: Behavior normal.         Thought Content: Thought content normal.         Assessment and Plan:  Diagnoses and all orders for this visit:    1. Women's annual routine gynecological examination (Primary)  Assessment & Plan:  Pap  Mammogram  Referral for colonoscopy for colon cancer screening  Recommend daily multivitamin with folic acid, calcium and vitamin D    Orders:  -     IGP,rfx Aptima HPV All Pth  -     Mammo Screening Digital Tomosynthesis Bilateral With CAD; Future    2. Menopausal vasomotor syndrome  Assessment & Plan:  The patient is happy with her estradiol.  She will continue estradiol 2 mg daily.  The risk, benefits, and alternatives were discussed including the risks of VTE    Orders:  -      estradiol (Estrace) 2 MG tablet; Take 1 tablet by mouth Daily.  Dispense: 90 tablet; Refill: 3    3. Colon cancer screening  -     Ambulatory Referral For Screening Colonoscopy        Counseling:     HRT R/B/A/SE/E all options including non-FDA approved options reviewed    Preventative:   MMG  Colonoscopy  s/p FLU vaccine this season  s/p COVID vaccine    She understands the importance of having any ordered tests to be performed in a timely fashion.  The risks of not performing them include, but are not limited to, advanced cancer stages, bone loss from osteoporosis and/or subsequent increase in morbidity and/or mortality.  She is encouraged to review her results online and/or contact or office if she has questions.     Follow Up:  Return for Annual physical.    Reji Nuñez MD  03/04/2025

## 2025-03-05 NOTE — ASSESSMENT & PLAN NOTE
The patient is happy with her estradiol.  She will continue estradiol 2 mg daily.  The risk, benefits, and alternatives were discussed including the risks of VTE

## 2025-03-05 NOTE — ASSESSMENT & PLAN NOTE
Pap  Mammogram  Referral for colonoscopy for colon cancer screening  Recommend daily multivitamin with folic acid, calcium and vitamin D

## 2025-03-11 LAB
CONV .: ABNORMAL
CYTOLOGIST CVX/VAG CYTO: ABNORMAL
CYTOLOGY CVX/VAG DOC CYTO: ABNORMAL
CYTOLOGY CVX/VAG DOC THIN PREP: ABNORMAL
DX ICD CODE: ABNORMAL
DX ICD CODE: ABNORMAL
HPV I/H RISK 4 DNA CVX QL PROBE+SIG AMP: POSITIVE
OTHER STN SPEC: ABNORMAL
PATHOLOGIST CVX/VAG CYTO: ABNORMAL
RECOM F/U CVX/VAG CYTO: ABNORMAL
SERVICE CMNT-IMP: ABNORMAL
STAT OF ADQ CVX/VAG CYTO-IMP: ABNORMAL

## 2025-03-17 ENCOUNTER — OFFICE VISIT (OUTPATIENT)
Dept: FAMILY MEDICINE CLINIC | Facility: CLINIC | Age: 45
End: 2025-03-17
Payer: COMMERCIAL

## 2025-03-17 VITALS
TEMPERATURE: 97.4 F | BODY MASS INDEX: 37.95 KG/M2 | SYSTOLIC BLOOD PRESSURE: 146 MMHG | DIASTOLIC BLOOD PRESSURE: 96 MMHG | HEART RATE: 73 BPM | OXYGEN SATURATION: 100 % | WEIGHT: 227.8 LBS | HEIGHT: 65 IN

## 2025-03-17 DIAGNOSIS — F41.1 GENERALIZED ANXIETY DISORDER: ICD-10-CM

## 2025-03-17 DIAGNOSIS — E66.01 CLASS 2 SEVERE OBESITY WITH SERIOUS COMORBIDITY AND BODY MASS INDEX (BMI) OF 37.0 TO 37.9 IN ADULT, UNSPECIFIED OBESITY TYPE: Primary | ICD-10-CM

## 2025-03-17 DIAGNOSIS — M79.672 LEFT FOOT PAIN: ICD-10-CM

## 2025-03-17 DIAGNOSIS — R41.840 DIFFICULTY CONCENTRATING: ICD-10-CM

## 2025-03-17 DIAGNOSIS — E78.2 MIXED HYPERLIPIDEMIA: Chronic | ICD-10-CM

## 2025-03-17 DIAGNOSIS — E66.812 CLASS 2 SEVERE OBESITY WITH SERIOUS COMORBIDITY AND BODY MASS INDEX (BMI) OF 37.0 TO 37.9 IN ADULT, UNSPECIFIED OBESITY TYPE: Primary | ICD-10-CM

## 2025-03-17 PROCEDURE — 99214 OFFICE O/P EST MOD 30 MIN: CPT | Performed by: NURSE PRACTITIONER

## 2025-03-17 RX ORDER — DULOXETIN HYDROCHLORIDE 30 MG/1
30 CAPSULE, DELAYED RELEASE ORAL DAILY
Qty: 90 CAPSULE | OUTPATIENT
Start: 2025-03-17

## 2025-03-17 RX ORDER — SIMVASTATIN 40 MG
40 TABLET ORAL NIGHTLY
Qty: 90 TABLET | Refills: 1 | Status: SHIPPED | OUTPATIENT
Start: 2025-03-17

## 2025-03-17 RX ORDER — CITALOPRAM HYDROBROMIDE 40 MG/1
40 TABLET ORAL DAILY
Qty: 90 TABLET | OUTPATIENT
Start: 2025-03-17

## 2025-03-17 RX ORDER — BUPROPION HYDROCHLORIDE 150 MG/1
150 TABLET ORAL EVERY MORNING
Qty: 90 TABLET | Refills: 1 | Status: SHIPPED | OUTPATIENT
Start: 2025-03-17

## 2025-03-17 RX ORDER — METHOCARBAMOL 750 MG/1
750 TABLET, FILM COATED ORAL NIGHTLY PRN
Qty: 30 TABLET | Refills: 2 | Status: CANCELLED | OUTPATIENT
Start: 2025-03-17

## 2025-03-17 RX ORDER — PHENTERMINE HYDROCHLORIDE 37.5 MG/1
37.5 CAPSULE ORAL EVERY MORNING
Qty: 30 CAPSULE | Refills: 0 | Status: SHIPPED | OUTPATIENT
Start: 2025-03-17

## 2025-03-17 RX ORDER — DICLOFENAC SODIUM 75 MG/1
75 TABLET, DELAYED RELEASE ORAL 2 TIMES DAILY
Qty: 180 TABLET | Refills: 1 | Status: SHIPPED | OUTPATIENT
Start: 2025-03-17

## 2025-03-17 NOTE — ASSESSMENT & PLAN NOTE
Orders:    buPROPion XL (Wellbutrin XL) 150 MG 24 hr tablet; Take 1 tablet by mouth Every Morning. Indications: anxiety, focusing

## 2025-03-17 NOTE — ASSESSMENT & PLAN NOTE
Orders:    simvastatin (ZOCOR) 40 MG tablet; Take 1 tablet by mouth Every Night. Indications: High Amount of Fats in the Blood

## 2025-03-17 NOTE — PROGRESS NOTES
Chief Complaint  Weight Check, Anxiety, and Hypertension    Patient or patient representative verbalized consent for the use of Ambient Listening during the visit with  SERA Read for chart documentation. 3/17/2025  10:54 EDT    Subjective            Natalya Bynum is a 45 y.o. female who presents to [facility]   History of Present Illness  History of Present Illness  The patient presents for weight loss, anxiety, left foot pain, elevated blood pressure, and cholesterol management.    She is currently on a regimen of phentermine for weight management. However, her physical activity has been limited due to a left foot condition initially suspected to be plantar fasciitis. Despite receiving a cortisone injection from her podiatrist 4 weeks ago, there has been no improvement in her symptoms. Radiographic imaging did not reveal any fractures, leading to a tentative diagnosis of torn tendons and ligaments on the lateral aspect of her foot. She was prescribed a boot 2 weeks ago and is scheduled for a follow-up appointment tomorrow. The boot, which she estimates weighs between 5 to 6 pounds, has not provided any relief. 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 She has attempted to perform arm exercises at the gym but finds it challenging to navigate the facility with her limp.    She is currently on citalopram 40 mg for anxiety management. She is also taking trazodone 50 mg at night to aid sleep, although she does not use it daily. She reports occasional sleep disturbances, which she attributes to stress. She has discontinued Wellbutrin and duloxetine. She has previously tried Trintellix and desvenlafaxine but found them too expensive. She only takes methocarbamol as needed. She occasionally takes a muscle relaxer at night if  "she has been active during the day. She is requesting a refill of diclofenac, which she uses for foot pain, but not on a daily basis. She occasionally takes ibuprofen but does not combine it with diclofenac. She has enough trazodone and fenofibrate at home.    Her blood pressure is noted to be elevated today in the office.  She is on amlodipine 5 mg daily.  She monitors her blood pressure at home, which typically remains within normal limits. She recently had a gynecological appointment where her blood pressure was also normal.    She is currently on simvastatin for cholesterol management.    FAMILY HISTORY  Her brother, who is 23 years old, is going to have open heart surgery to replace his main valve. Her grandmother's  is 101 years old and is currently in the hospital.    MEDICATIONS  Current: Phentermine, citalopram, trazodone, simvastatin, amlodipine, diclofenac.  Discontinued: Duloxetine, desvenlafaxine, baclofen, ibuprofen.      Tobacco Use: Medium Risk (3/17/2025)    Patient History     Smoking Tobacco Use: Former     Smokeless Tobacco Use: Never     Passive Exposure: Not on file      E-cigarette/Vaping    E-cigarette/Vaping Use Never User      E-cigarette/Vaping Substances     E-cigarette/Vaping Devices       Alcohol Use: Not on file         Objective   Vital Signs:   Vitals:    03/17/25 1027 03/17/25 1031   BP: (!) 150/102 146/96   BP Location: Left arm    Patient Position: Sitting    Cuff Size: Adult    Pulse: 73    Temp: 97.4 °F (36.3 °C)    SpO2: 100%    Weight: 103 kg (227 lb 12.8 oz)    Height: 165.1 cm (65\")    PainSc: 6     PainLoc: Foot      Body mass index is 37.91 kg/m².    Wt Readings from Last 3 Encounters:   03/17/25 103 kg (227 lb 12.8 oz)   03/04/25 103 kg (227 lb)   02/17/25 101 kg (222 lb 11.2 oz)     BP Readings from Last 3 Encounters:   03/17/25 146/96   03/04/25 119/80   02/17/25 110/80       Health Maintenance   Topic Date Due    COLORECTAL CANCER SCREENING  Never done    " "COVID-19 Vaccine (4 - 2024-25 season) 11/04/2025 (Originally 9/1/2024)    ANNUAL PHYSICAL  05/30/2025    LIPID PANEL  01/20/2026    BMI FOLLOWUP  03/17/2026    MAMMOGRAM  06/04/2026    TDAP/TD VACCINES (2 - Td or Tdap) 07/18/2033    INFLUENZA VACCINE  Completed    Pneumococcal Vaccine 0-49  Aged Out    HEPATITIS C SCREENING  Discontinued       /96   Pulse 73   Temp 97.4 °F (36.3 °C)   Ht 165.1 cm (65\")   Wt 103 kg (227 lb 12.8 oz)   SpO2 100%   BMI 37.91 kg/m²       Current Outpatient Medications:     amLODIPine (NORVASC) 5 MG tablet, Take 1 tablet by mouth Daily. Indications: High Blood Pressure, Disp: 90 tablet, Rfl: 1    citalopram (CeleXA) 40 MG tablet, Take 1 tablet by mouth Daily., Disp: , Rfl:     diclofenac (VOLTAREN) 75 MG EC tablet, Take 1 tablet by mouth 2 (Two) Times a Day. Indications: foot pain, Disp: 180 tablet, Rfl: 1    estradiol (Estrace) 2 MG tablet, Take 1 tablet by mouth Daily., Disp: 90 tablet, Rfl: 3    fenofibrate 160 MG tablet, Take 1 tablet by mouth Every Night. Indications: Elevation of Both Cholesterol and Triglycerides in Blood, Disp: 90 tablet, Rfl: 1    methocarbamol (ROBAXIN) 750 MG tablet, Take 1 tablet by mouth At Night As Needed for Muscle Spasms., Disp: 30 tablet, Rfl: 2    phentermine 37.5 MG capsule, Take 1 capsule by mouth Every Morning. Indications: OBESITY, Disp: 30 capsule, Rfl: 0    simvastatin (ZOCOR) 40 MG tablet, Take 1 tablet by mouth Every Night. Indications: High Amount of Fats in the Blood, Disp: 90 tablet, Rfl: 1    traZODone (DESYREL) 50 MG tablet, Take 1 tablet by mouth At Night As Needed for Sleep. Indications: Trouble Sleeping, Disp: 90 tablet, Rfl: 1    buPROPion XL (Wellbutrin XL) 150 MG 24 hr tablet, Take 1 tablet by mouth Every Morning. Indications: anxiety, focusing, Disp: 90 tablet, Rfl: 1   Past Medical History:   Diagnosis Date    Abnormal Pap smear of cervix     Cervical dysplasia     H. pylori infection     HPV (human papilloma virus) " infection     Hyperlipidemia     Hypertension 2023    LGSIL Pap smear of vagina 10/27/2022    Migraine     Obesity         Physical Exam  Vitals reviewed.   Constitutional:       Appearance: Normal appearance. She is well-developed. She is obese.   Neck:      Thyroid: No thyroid mass, thyromegaly or thyroid tenderness.   Cardiovascular:      Rate and Rhythm: Normal rate and regular rhythm.      Heart sounds: No murmur heard.     No friction rub. No gallop.   Pulmonary:      Effort: Pulmonary effort is normal.      Breath sounds: Normal breath sounds. No wheezing or rhonchi.   Musculoskeletal:      Comments: Walking boot noted to left foot.   Lymphadenopathy:      Cervical: No cervical adenopathy.   Skin:     General: Skin is warm and dry.   Neurological:      Mental Status: She is alert and oriented to person, place, and time.      Cranial Nerves: No cranial nerve deficit.   Psychiatric:         Mood and Affect: Mood and affect normal.         Behavior: Behavior normal.         Thought Content: Thought content normal. Thought content does not include homicidal or suicidal ideation.         Judgment: Judgment normal.            Physical Exam  Vital Signs  The patient's weight is 227. Blood pressure is 146/96.      Result Review :    The following data was reviewed by: SERA Read on 03/17/2025:  Common Labs   Common labs          7/25/2024    07:40 1/20/2025    08:19   Common Labs   Glucose 80  88    BUN 22  14    Creatinine 1.14  0.86    Sodium 137  141    Potassium 4.1  5.0    Chloride 102  106    Calcium 9.1  9.6    Albumin 4.2  3.5    Total Bilirubin 0.4  0.2    Alkaline Phosphatase 38  46    AST (SGOT) 25  23    ALT (SGPT) 28  20    WBC 9.67     Hemoglobin 13.2     Hematocrit 40.5     Platelets 392     Total Cholesterol 130  202    Triglycerides 117  158    HDL Cholesterol 43  52    LDL Cholesterol  66  122        Results  Imaging  X-rays of the left foot showed no fractures.      Procedures      Assessment & Plan  Class 2 severe obesity with serious comorbidity and body mass index (BMI) of 37.0 to 37.9 in adult, unspecified obesity type      Orders:    phentermine 37.5 MG capsule; Take 1 capsule by mouth Every Morning. Indications: OBESITY    Mixed hyperlipidemia       Orders:    simvastatin (ZOCOR) 40 MG tablet; Take 1 tablet by mouth Every Night. Indications: High Amount of Fats in the Blood    Left foot pain    Orders:    diclofenac (VOLTAREN) 75 MG EC tablet; Take 1 tablet by mouth 2 (Two) Times a Day. Indications: foot pain    Generalized anxiety disorder           Difficulty concentrating    Orders:    buPROPion XL (Wellbutrin XL) 150 MG 24 hr tablet; Take 1 tablet by mouth Every Morning. Indications: anxiety, focusing         Assessment & Plan  1. Weight loss.  Her weight has increased from 222 to 227 since the last visit, likely due to the use of a boot for her foot condition.  The boot weighs approximately 5 to 6 pounds so she weighed with the boot on.  She will continue her phentermine regimen for weight loss. A prescription for a 90-day supply of phentermine will be provided.    2. Anxiety.  She is currently on citalopram 40 mg and trazodone 50 mg at night for sleep. She reports that her current medication is not sufficiently managing her anxiety. She will be reintroduced to Wellbutrin (bupropion) 150 mg, to be taken in the morning. A prescription for a 90-day supply of Wellbutrin will be provided.    3. Left foot pain.  She reports persistent pain despite a cortisone shot administered 4 weeks ago. X-rays were normal, but there is a suspicion of torn tendons and ligaments. She will continue taking diclofenac as needed for pain. A prescription for a 90-day supply of diclofenac will be provided.    4. Elevated blood pressure.  Her blood pressure was recorded at 146/96 during this visit. She reports that her blood pressure readings at home are usually close to perfect. She will continue  monitoring her blood pressure at home and avoid taking phentermine if her blood pressure is elevated.    5. Cholesterol management.  She will continue her simvastatin regimen for cholesterol management. A prescription for a 90-day supply of simvastatin will be provided.    PROCEDURE  The patient received a cortisone injection from her podiatrist 4 weeks ago.       Diagnosis Plan   1. Class 2 severe obesity with serious comorbidity and body mass index (BMI) of 37.0 to 37.9 in adult, unspecified obesity type  phentermine 37.5 MG capsule      2. Mixed hyperlipidemia  simvastatin (ZOCOR) 40 MG tablet      3. Left foot pain  diclofenac (VOLTAREN) 75 MG EC tablet      4. Generalized anxiety disorder        5. Difficulty concentrating  buPROPion XL (Wellbutrin XL) 150 MG 24 hr tablet            FOLLOW UP  Return in about 3 months (around 6/17/2025) for Annual physical.  Patient was given instructions and counseling regarding her condition or for health maintenance advice. Please see specific information pulled into the AVS if appropriate.       CURRENT & DISCONTINUED MEDICATIONS  Current Outpatient Medications   Medication Instructions    amLODIPine (NORVASC) 5 mg, Oral, Daily    buPROPion XL (WELLBUTRIN XL) 150 mg, Oral, Every Morning    citalopram (CELEXA) 40 mg, Daily    diclofenac (VOLTAREN) 75 mg, Oral, 2 Times Daily    estradiol (ESTRACE) 2 mg, Oral, Daily    fenofibrate 160 mg, Oral, Nightly    methocarbamol (ROBAXIN) 750 mg, Oral, Nightly PRN    phentermine 37.5 mg, Oral, Every Morning    simvastatin (ZOCOR) 40 mg, Oral, Nightly    traZODone (DESYREL) 50 mg, Oral, Nightly PRN       Medications Discontinued During This Encounter   Medication Reason    ibuprofen (ADVIL,MOTRIN) 800 MG tablet *Therapy completed    simvastatin (ZOCOR) 40 MG tablet Reorder    diclofenac (VOLTAREN) 75 MG EC tablet Reorder    phentermine 37.5 MG capsule Reorder        Parts of this note are electronic transcriptions/translations of spoken  language to printed text using the Dragon Dictation system.    Macarena Carter, APRN  03/17/25  16:13 EDT

## 2025-03-17 NOTE — ASSESSMENT & PLAN NOTE
Orders:    diclofenac (VOLTAREN) 75 MG EC tablet; Take 1 tablet by mouth 2 (Two) Times a Day. Indications: foot pain

## 2025-03-20 ENCOUNTER — TELEPHONE (OUTPATIENT)
Dept: OBSTETRICS AND GYNECOLOGY | Facility: CLINIC | Age: 45
End: 2025-03-20
Payer: COMMERCIAL

## 2025-03-20 NOTE — TELEPHONE ENCOUNTER
UNABLE TO CONTACT / TRIED 3 TIMES 3/7/25, 3/13/25 & 3/18/25 ( SEE REFERRAL ) / LETTER MAILED TO ADDRESS ON FILE / REFERRAL FOR SCREENING COLONOSCOPY

## 2025-03-28 ENCOUNTER — PATIENT ROUNDING (BHMG ONLY) (OUTPATIENT)
Dept: FAMILY MEDICINE CLINIC | Facility: CLINIC | Age: 45
End: 2025-03-28
Payer: COMMERCIAL

## 2025-03-28 NOTE — PROGRESS NOTES
A My-Chart message has been sent to the patient for PATIENT ROUNDING with [facility].

## 2025-04-22 ENCOUNTER — PROCEDURE VISIT (OUTPATIENT)
Dept: OBSTETRICS AND GYNECOLOGY | Age: 45
End: 2025-04-22
Payer: COMMERCIAL

## 2025-04-22 VITALS
HEIGHT: 65 IN | BODY MASS INDEX: 38.32 KG/M2 | WEIGHT: 230 LBS | HEART RATE: 78 BPM | DIASTOLIC BLOOD PRESSURE: 86 MMHG | SYSTOLIC BLOOD PRESSURE: 126 MMHG

## 2025-04-22 DIAGNOSIS — R87.810 ASCUS WITH POSITIVE HIGH RISK HPV CERVICAL: Primary | ICD-10-CM

## 2025-04-22 DIAGNOSIS — R87.610 ASCUS WITH POSITIVE HIGH RISK HPV CERVICAL: Primary | ICD-10-CM

## 2025-04-22 PROBLEM — Z01.419 WOMEN'S ANNUAL ROUTINE GYNECOLOGICAL EXAMINATION: Status: RESOLVED | Noted: 2023-01-23 | Resolved: 2025-04-22

## 2025-04-22 NOTE — PROGRESS NOTES
"[facility]  Colposcopy    CC: Abnormal pap    Subjective:  The patient presents for colposcopy today, due to an abnormal pap smear.     Objective:  /86   Pulse 78   Ht 165.1 cm (65\")   Wt 104 kg (230 lb)   Breastfeeding No   BMI 38.27 kg/m²   Urine pregnancy test: Not done  Serum pregnancy test: Not done    The procedure was reviewed in detail.  She understands the potential risks include, but are not limited to, pain, bleeding, and infection.  Her questions have been answered, and she desires to proceed.    Time Out: Prior the procedure, procedure verification was completed. The patient was verified and consents confirmed. The procedure site(s) were identified.    Physical Exam  Vitals and nursing note reviewed. Exam conducted with a chaperone present.   Constitutional:       General: She is not in acute distress.     Appearance: Normal appearance. She is not ill-appearing.   HENT:      Head: Normocephalic and atraumatic.   Genitourinary:     General: Normal vulva.      Exam position: Lithotomy position.      Labia:         Right: No rash, tenderness, lesion or injury.         Left: No rash, tenderness, lesion or injury.       Vagina: No signs of injury. No vaginal discharge, erythema, tenderness, bleeding, lesions or prolapsed vaginal walls.            Comments: The uterus and cervix are surgically absent    No acetowhite epithelium or decreased Lugol's uptake was noted    No biopsy indicated  Musculoskeletal:      Right lower leg: No edema.      Left lower leg: No edema.   Skin:     General: Skin is warm and dry.      Findings: No rash.   Neurological:      Mental Status: She is alert and oriented to person, place, and time.   Psychiatric:         Mood and Affect: Mood normal.         Behavior: Behavior normal.         Thought Content: Thought content normal.         Judgment: Judgment normal.         Assessment and Plan:  Diagnoses and all orders for this visit:    1. ASCUS with " positive high risk HPV cervical (Primary)  Assessment & Plan:  The patient's colposcopy today was normal.  I recommended repeat Pap smear in 1 year.          Counseling:  She understands the need for continued follow up until she is cleared to return to routine screening pap smears.  She understands the importance of follow up and consequences with lack of follow up (i.e. potential for cervical cancer).    We discussed her Pap diagnosis and HPV in detail.  HPV incidence, transmission, course, remission, progression, types, cervical cancer, genital warts, monitoring, and importance of compliance were reviewed.  Importance of maintaining a healthy lifestyle, HPV vaccine was discussed and recommended.  Written information was made available, I recommend she quit smoking.  Smoking increases risk of progression and cervical cancer.  I recommend she FU w PCP to assist if unable to do on her own    Precautions: It is common to have bright red spotting and dark discharge after today.  If cervical biopsies were peformed, she is to avoid intercourse or tampons as directed for 4-5 days.  She can use OTC pain relievers as needed.  She needs to return to office or go to the ER if she has severe pelvic pain, bleeding greater than 1 pad in less than 1-2 hours, has foul smelling vaginal, fever, or other concerns.    Follow Up:  Return for Annual physical.    Reji Nuñez MD  04/22/2025

## 2025-04-24 DIAGNOSIS — R60.0 BILATERAL LOWER EXTREMITY EDEMA: Primary | ICD-10-CM

## 2025-04-24 DIAGNOSIS — F41.1 GENERALIZED ANXIETY DISORDER: ICD-10-CM

## 2025-04-24 RX ORDER — HYDROXYZINE HYDROCHLORIDE 25 MG/1
25 TABLET, FILM COATED ORAL 3 TIMES DAILY PRN
Qty: 30 TABLET | Refills: 0 | Status: SHIPPED | OUTPATIENT
Start: 2025-04-24

## 2025-04-24 RX ORDER — FUROSEMIDE 20 MG/1
20 TABLET ORAL DAILY
Qty: 10 TABLET | Refills: 0 | Status: SHIPPED | OUTPATIENT
Start: 2025-04-24

## 2025-05-01 DIAGNOSIS — R60.0 BILATERAL LOWER EXTREMITY EDEMA: ICD-10-CM

## 2025-05-01 RX ORDER — FUROSEMIDE 20 MG/1
20 TABLET ORAL DAILY
Qty: 5 TABLET | Refills: 0 | Status: SHIPPED | OUTPATIENT
Start: 2025-05-01 | End: 2025-05-05 | Stop reason: SDUPTHER

## 2025-05-01 NOTE — TELEPHONE ENCOUNTER
I got a refill request for her Lasix so I sent in another 5 days of Lasix to get her through until she sees me next week.

## 2025-05-05 ENCOUNTER — OFFICE VISIT (OUTPATIENT)
Dept: FAMILY MEDICINE CLINIC | Facility: CLINIC | Age: 45
End: 2025-05-05
Payer: COMMERCIAL

## 2025-05-05 VITALS
HEART RATE: 86 BPM | DIASTOLIC BLOOD PRESSURE: 87 MMHG | SYSTOLIC BLOOD PRESSURE: 126 MMHG | BODY MASS INDEX: 37.14 KG/M2 | TEMPERATURE: 97.6 F | WEIGHT: 223.2 LBS | OXYGEN SATURATION: 98 %

## 2025-05-05 DIAGNOSIS — R60.0 BILATERAL EDEMA OF LOWER EXTREMITY: Primary | ICD-10-CM

## 2025-05-05 DIAGNOSIS — F41.1 GENERALIZED ANXIETY DISORDER: ICD-10-CM

## 2025-05-05 DIAGNOSIS — E66.812 CLASS 2 SEVERE OBESITY WITH SERIOUS COMORBIDITY AND BODY MASS INDEX (BMI) OF 37.0 TO 37.9 IN ADULT, UNSPECIFIED OBESITY TYPE: ICD-10-CM

## 2025-05-05 DIAGNOSIS — E78.2 MIXED HYPERLIPIDEMIA: Chronic | ICD-10-CM

## 2025-05-05 DIAGNOSIS — E66.01 CLASS 2 SEVERE OBESITY WITH SERIOUS COMORBIDITY AND BODY MASS INDEX (BMI) OF 37.0 TO 37.9 IN ADULT, UNSPECIFIED OBESITY TYPE: ICD-10-CM

## 2025-05-05 PROCEDURE — 99214 OFFICE O/P EST MOD 30 MIN: CPT | Performed by: NURSE PRACTITIONER

## 2025-05-05 RX ORDER — FUROSEMIDE 20 MG/1
20 TABLET ORAL DAILY
Qty: 30 TABLET | Refills: 2 | Status: SHIPPED | OUTPATIENT
Start: 2025-05-05

## 2025-05-05 NOTE — PROGRESS NOTES
Chief Complaint  follow up on lasix     Patient or patient representative verbalized consent for the use of Ambient Listening during the visit with  SERA Read for chart documentation. 5/5/2025  10:11 EDT    Subjective            Natalya Bynum is a 45 y.o. female who presents to [facility]   History of Present Illness  History of Present Illness  The patient presents for a follow-up after starting Lasix for swelling in her lower extremities.    She reports an improvement in her lower extremity swelling, with no associated pain or discomfort. She notes that her right leg is larger than her left, a condition she attributes to her history of playing softball. She is currently on a daily regimen of Lasix 20 mg, which she has previously used. She has requested a refill of her Lasix prescription, as she only received 10 tablets initially and took her last dose this morning.    I recently prescribed her hydroxyzine to take as needed for anxiety after the sudden loss of her brother due to heart issues.  She states it does sometimes cause her excessive drowsiness so she has to make sure to take the medicine early before she goes to bed.    She admits to not taking her cholesterol medication regularly at the time of her last labs, but has since resumed regular intake. She is not fasting today. She is on simvastatin 40 mg and fenofibrate 160 mg daily.    She has been out of phentermine but reports significant weight loss over the past month due to regular gym attendance and improved dietary habits. She prefers to continue her current regimen without reintroducing phentermine.    She occasionally takes diclofenac for pain management. She also takes estradiol, prescribed by Dr. Nuñez, and hydroxyzine as needed. She has methocarbamol available for use as needed but does not take it frequently. She is on trazodone 50 mg at night as needed. She has been prescribed a new  medication for anxiety, which she takes at night, but only occasionally due to its drowsiness-inducing effect. She is on amlodipine, Wellbutrin, and citalopram.        Tobacco Use: Medium Risk (5/5/2025)    Patient History     Smoking Tobacco Use: Former     Smokeless Tobacco Use: Never     Passive Exposure: Not on file      E-cigarette/Vaping    E-cigarette/Vaping Use Never User      E-cigarette/Vaping Substances     E-cigarette/Vaping Devices       Alcohol Use: Not on file         Objective   Vital Signs:   Vitals:    05/05/25 0955   BP: 126/87   Pulse: 86   Temp: 97.6 °F (36.4 °C)   SpO2: 98%   Weight: 101 kg (223 lb 3.2 oz)     Body mass index is 37.14 kg/m².    Wt Readings from Last 3 Encounters:   05/05/25 101 kg (223 lb 3.2 oz)   04/22/25 104 kg (230 lb)   03/17/25 103 kg (227 lb 12.8 oz)     BP Readings from Last 3 Encounters:   05/05/25 126/87   04/22/25 126/86   03/17/25 146/96       Health Maintenance   Topic Date Due    COLORECTAL CANCER SCREENING  Never done    COVID-19 Vaccine (4 - 2024-25 season) 11/04/2025 (Originally 9/1/2024)    ANNUAL PHYSICAL  05/30/2025    INFLUENZA VACCINE  07/01/2025    LIPID PANEL  01/20/2026    MAMMOGRAM  06/04/2026    TDAP/TD VACCINES (2 - Td or Tdap) 07/18/2033    Pneumococcal Vaccine 0-49  Aged Out    HEPATITIS C SCREENING  Discontinued       /87   Pulse 86   Temp 97.6 °F (36.4 °C)   Wt 101 kg (223 lb 3.2 oz)   SpO2 98%   BMI 37.14 kg/m²       Current Outpatient Medications:     amLODIPine (NORVASC) 5 MG tablet, Take 1 tablet by mouth Daily. Indications: High Blood Pressure, Disp: 90 tablet, Rfl: 1    buPROPion XL (Wellbutrin XL) 150 MG 24 hr tablet, Take 1 tablet by mouth Every Morning. Indications: anxiety, focusing, Disp: 90 tablet, Rfl: 1    citalopram (CeleXA) 40 MG tablet, Take 1 tablet by mouth Daily., Disp: , Rfl:     diclofenac (VOLTAREN) 75 MG EC tablet, Take 1 tablet by mouth 2 (Two) Times a Day. Indications: foot pain (Patient taking differently:  Take 1 tablet by mouth 2 (Two) Times a Day As Needed (pain). Indications: foot pain), Disp: 180 tablet, Rfl: 1    estradiol (Estrace) 2 MG tablet, Take 1 tablet by mouth Daily., Disp: 90 tablet, Rfl: 3    fenofibrate 160 MG tablet, Take 1 tablet by mouth Every Night. Indications: Elevation of Both Cholesterol and Triglycerides in Blood, Disp: 90 tablet, Rfl: 1    furosemide (LASIX) 20 MG tablet, TAKE 1 TABLET BY MOUTH DAILY, Disp: 5 tablet, Rfl: 0    hydrOXYzine (ATARAX) 25 MG tablet, Take 1 tablet by mouth 3 (Three) Times a Day As Needed for Anxiety. Caution drowsiness  Indications: Feeling Anxious, Disp: 30 tablet, Rfl: 0    methocarbamol (ROBAXIN) 750 MG tablet, Take 1 tablet by mouth At Night As Needed for Muscle Spasms., Disp: 30 tablet, Rfl: 2    simvastatin (ZOCOR) 40 MG tablet, Take 1 tablet by mouth Every Night. Indications: High Amount of Fats in the Blood, Disp: 90 tablet, Rfl: 1    traZODone (DESYREL) 50 MG tablet, Take 1 tablet by mouth At Night As Needed for Sleep. Indications: Trouble Sleeping, Disp: 90 tablet, Rfl: 1   Past Medical History:   Diagnosis Date    Abnormal Pap smear of cervix     Cervical dysplasia     H. pylori infection     HPV (human papilloma virus) infection     Hyperlipidemia     Hypertension 2023    LGSIL Pap smear of vagina 10/27/2022    Migraine     Obesity         Physical Exam  Vitals reviewed.   Constitutional:       Appearance: Normal appearance. She is well-developed. She is obese.   Neck:      Thyroid: No thyroid mass, thyromegaly or thyroid tenderness.   Cardiovascular:      Rate and Rhythm: Normal rate and regular rhythm.      Heart sounds: No murmur heard.     No friction rub. No gallop.   Pulmonary:      Effort: Pulmonary effort is normal.      Breath sounds: Normal breath sounds. No wheezing or rhonchi.   Musculoskeletal:      Right lower leg: No edema.      Left lower leg: No edema.   Lymphadenopathy:      Cervical: No cervical adenopathy.   Skin:     General: Skin  is warm and dry.   Neurological:      Mental Status: She is alert and oriented to person, place, and time.      Cranial Nerves: No cranial nerve deficit.   Psychiatric:         Mood and Affect: Mood and affect normal.         Behavior: Behavior normal.         Thought Content: Thought content normal. Thought content does not include homicidal or suicidal ideation.         Judgment: Judgment normal.            Physical Exam  Extremities: Swelling in the right lower extremity noted.      Result Review :    The following data was reviewed by: SERA Read on 05/05/2025:  Common Labs   Common labs          7/25/2024    07:40 1/20/2025    08:19   Common Labs   Glucose 80  88    BUN 22  14    Creatinine 1.14  0.86    Sodium 137  141    Potassium 4.1  5.0    Chloride 102  106    Calcium 9.1  9.6    Albumin 4.2  3.5    Total Bilirubin 0.4  0.2    Alkaline Phosphatase 38  46    AST (SGOT) 25  23    ALT (SGPT) 28  20    WBC 9.67     Hemoglobin 13.2     Hematocrit 40.5     Platelets 392     Total Cholesterol 130  202    Triglycerides 117  158    HDL Cholesterol 43  52    LDL Cholesterol  66  122         No Images in the past 120 days found.    Results        Assessment & Plan  Bilateral edema of lower extremity    Orders:    CBC Auto Differential; Future    Comprehensive Metabolic Panel; Future    furosemide (LASIX) 20 MG tablet; Take 1 tablet by mouth Daily. Indications: Edema    Mixed hyperlipidemia       Orders:    Comprehensive Metabolic Panel; Future    Lipid Panel; Future    Generalized anxiety disorder      Orders:    TSH Rfx On Abnormal To Free T4; Future    Class 2 severe obesity with serious comorbidity and body mass index (BMI) of 37.0 to 37.9 in adult, unspecified obesity type      Orders:    Vitamin D,25-Hydroxy; Future      Assessment & Plan  1. Lower extremity edema.  - Her lower extremity edema has shown improvement with the current dosage of Lasix 20 mg once daily.  - She reports mild swelling in  the afternoons but no pain or discomfort.  - A basic panel will be ordered to monitor her electrolyte levels, including potassium, due to the potential for Lasix to cause potassium depletion.  - A prescription refill for Lasix 20 mg will be sent to the pharmacy.    2. Hypercholesterolemia.  - Her cholesterol levels were elevated during the last assessment.  - She has resumed taking simvastatin 40 mg daily and fenofibrate 160 mg daily.  - Fasting labs will be scheduled to reassess her cholesterol levels now that she is back on her medication regimen.  - The cholesterol levels will be included in the upcoming lab work.    3. Weight management.  - She has experienced weight loss over the past month through regular gym attendance and improved dietary habits.  - She has been off phentermine and prefers to continue her current regimen without reintroducing it.  - If she struggles with weight management again, restarting phentermine will be considered.  - Weight loss progress noted, no current need for phentermine.    4. Medication management/anxiety.  - She is currently taking amlodipine, Wellbutrin, citalopram (Celexa), estradiol, hydroxyzine as needed, and trazodone 50 mg at night as needed.  - She occasionally takes diclofenac and methocarbamol only as needed for pain.  - She has been advised to take half a tablet of her anxiety medication if the full dose causes excessive drowsiness.  - Medication list reviewed and updated accordingly.       Diagnosis Plan   1. Bilateral edema of lower extremity  CBC Auto Differential    Comprehensive Metabolic Panel      2. Mixed hyperlipidemia  Comprehensive Metabolic Panel    Lipid Panel      3. Generalized anxiety disorder  TSH Rfx On Abnormal To Free T4      4. Class 2 severe obesity with serious comorbidity and body mass index (BMI) of 37.0 to 37.9 in adult, unspecified obesity type  Vitamin D,25-Hydroxy            FOLLOW UP  No follow-ups on file.  Patient was given  instructions and counseling regarding her condition or for health maintenance advice. Please see specific information pulled into the AVS if appropriate.       CURRENT & DISCONTINUED MEDICATIONS  Current Outpatient Medications   Medication Instructions    amLODIPine (NORVASC) 5 mg, Oral, Daily    buPROPion XL (WELLBUTRIN XL) 150 mg, Oral, Every Morning    citalopram (CELEXA) 40 mg, Daily    diclofenac (VOLTAREN) 75 mg, Oral, 2 Times Daily    estradiol (ESTRACE) 2 mg, Oral, Daily    fenofibrate 160 mg, Oral, Nightly    furosemide (LASIX) 20 mg, Oral, Daily    hydrOXYzine (ATARAX) 25 mg, Oral, 3 Times Daily PRN, Caution drowsiness    methocarbamol (ROBAXIN) 750 mg, Oral, Nightly PRN    simvastatin (ZOCOR) 40 mg, Oral, Nightly    traZODone (DESYREL) 50 mg, Oral, Nightly PRN       Medications Discontinued During This Encounter   Medication Reason    phentermine 37.5 MG capsule *Therapy completed        Parts of this note are electronic transcriptions/translations of spoken language to printed text using the Dragon Dictation system.    Macarena Carter, SERA  05/05/25  10:10 EDT

## 2025-05-05 NOTE — ASSESSMENT & PLAN NOTE
Orders:    CBC Auto Differential; Future    Comprehensive Metabolic Panel; Future    furosemide (LASIX) 20 MG tablet; Take 1 tablet by mouth Daily. Indications: Edema

## 2025-05-06 ENCOUNTER — LAB (OUTPATIENT)
Dept: FAMILY MEDICINE CLINIC | Facility: CLINIC | Age: 45
End: 2025-05-06
Payer: COMMERCIAL

## 2025-05-06 DIAGNOSIS — E78.2 MIXED HYPERLIPIDEMIA: Chronic | ICD-10-CM

## 2025-05-06 DIAGNOSIS — F41.1 GENERALIZED ANXIETY DISORDER: ICD-10-CM

## 2025-05-06 DIAGNOSIS — E66.01 CLASS 2 SEVERE OBESITY WITH SERIOUS COMORBIDITY AND BODY MASS INDEX (BMI) OF 37.0 TO 37.9 IN ADULT, UNSPECIFIED OBESITY TYPE: ICD-10-CM

## 2025-05-06 DIAGNOSIS — E66.812 CLASS 2 SEVERE OBESITY WITH SERIOUS COMORBIDITY AND BODY MASS INDEX (BMI) OF 37.0 TO 37.9 IN ADULT, UNSPECIFIED OBESITY TYPE: ICD-10-CM

## 2025-05-06 DIAGNOSIS — R60.0 BILATERAL EDEMA OF LOWER EXTREMITY: ICD-10-CM

## 2025-05-06 LAB
25(OH)D3 SERPL-MCNC: 48.4 NG/ML (ref 30–100)
ALBUMIN SERPL-MCNC: 3.9 G/DL (ref 3.5–5.2)
ALBUMIN/GLOB SERPL: 1.4 G/DL
ALP SERPL-CCNC: 50 U/L (ref 39–117)
ALT SERPL W P-5'-P-CCNC: 26 U/L (ref 1–33)
ANION GAP SERPL CALCULATED.3IONS-SCNC: 9.9 MMOL/L (ref 5–15)
AST SERPL-CCNC: 30 U/L (ref 1–32)
BASOPHILS # BLD AUTO: 0.12 10*3/MM3 (ref 0–0.2)
BASOPHILS NFR BLD AUTO: 0.9 % (ref 0–1.5)
BILIRUB SERPL-MCNC: 0.3 MG/DL (ref 0–1.2)
BUN SERPL-MCNC: 14 MG/DL (ref 6–20)
BUN/CREAT SERPL: 10.6 (ref 7–25)
CALCIUM SPEC-SCNC: 9.7 MG/DL (ref 8.6–10.5)
CHLORIDE SERPL-SCNC: 102 MMOL/L (ref 98–107)
CHOLEST SERPL-MCNC: 184 MG/DL (ref 0–200)
CO2 SERPL-SCNC: 25.1 MMOL/L (ref 22–29)
CREAT SERPL-MCNC: 1.32 MG/DL (ref 0.57–1)
DEPRECATED RDW RBC AUTO: 40.8 FL (ref 37–54)
EGFRCR SERPLBLD CKD-EPI 2021: 50.5 ML/MIN/1.73
EOSINOPHIL # BLD AUTO: 0.21 10*3/MM3 (ref 0–0.4)
EOSINOPHIL NFR BLD AUTO: 1.7 % (ref 0.3–6.2)
ERYTHROCYTE [DISTWIDTH] IN BLOOD BY AUTOMATED COUNT: 12.3 % (ref 12.3–15.4)
GLOBULIN UR ELPH-MCNC: 2.7 GM/DL
GLUCOSE SERPL-MCNC: 73 MG/DL (ref 65–99)
HCT VFR BLD AUTO: 41.5 % (ref 34–46.6)
HDLC SERPL-MCNC: 46 MG/DL (ref 40–60)
HGB BLD-MCNC: 13.5 G/DL (ref 12–15.9)
IMM GRANULOCYTES # BLD AUTO: 0.04 10*3/MM3 (ref 0–0.05)
IMM GRANULOCYTES NFR BLD AUTO: 0.3 % (ref 0–0.5)
LDLC SERPL CALC-MCNC: 113 MG/DL (ref 0–100)
LDLC/HDLC SERPL: 2.4 {RATIO}
LYMPHOCYTES # BLD AUTO: 3.23 10*3/MM3 (ref 0.7–3.1)
LYMPHOCYTES NFR BLD AUTO: 25.6 % (ref 19.6–45.3)
MCH RBC QN AUTO: 29.8 PG (ref 26.6–33)
MCHC RBC AUTO-ENTMCNC: 32.5 G/DL (ref 31.5–35.7)
MCV RBC AUTO: 91.6 FL (ref 79–97)
MONOCYTES # BLD AUTO: 1.06 10*3/MM3 (ref 0.1–0.9)
MONOCYTES NFR BLD AUTO: 8.4 % (ref 5–12)
NEUTROPHILS NFR BLD AUTO: 63.1 % (ref 42.7–76)
NEUTROPHILS NFR BLD AUTO: 7.98 10*3/MM3 (ref 1.7–7)
NRBC BLD AUTO-RTO: 0 /100 WBC (ref 0–0.2)
PLATELET # BLD AUTO: 533 10*3/MM3 (ref 140–450)
PMV BLD AUTO: 11.1 FL (ref 6–12)
POTASSIUM SERPL-SCNC: 4.7 MMOL/L (ref 3.5–5.2)
PROT SERPL-MCNC: 6.6 G/DL (ref 6–8.5)
RBC # BLD AUTO: 4.53 10*6/MM3 (ref 3.77–5.28)
SODIUM SERPL-SCNC: 137 MMOL/L (ref 136–145)
TRIGL SERPL-MCNC: 138 MG/DL (ref 0–150)
TSH SERPL DL<=0.05 MIU/L-ACNC: 2.81 UIU/ML (ref 0.27–4.2)
VLDLC SERPL-MCNC: 25 MG/DL (ref 5–40)
WBC NRBC COR # BLD AUTO: 12.64 10*3/MM3 (ref 3.4–10.8)

## 2025-05-06 PROCEDURE — 80061 LIPID PANEL: CPT | Performed by: NURSE PRACTITIONER

## 2025-05-06 PROCEDURE — 80050 GENERAL HEALTH PANEL: CPT | Performed by: NURSE PRACTITIONER

## 2025-05-06 PROCEDURE — 82306 VITAMIN D 25 HYDROXY: CPT | Performed by: NURSE PRACTITIONER

## 2025-05-06 PROCEDURE — 36415 COLL VENOUS BLD VENIPUNCTURE: CPT | Performed by: NURSE PRACTITIONER

## 2025-05-07 ENCOUNTER — RESULTS FOLLOW-UP (OUTPATIENT)
Dept: FAMILY MEDICINE CLINIC | Facility: CLINIC | Age: 45
End: 2025-05-07
Payer: COMMERCIAL

## 2025-05-07 NOTE — TELEPHONE ENCOUNTER
Patient aware results voiced understanding. Voiced understanding instructions medications per provider.

## 2025-05-08 ENCOUNTER — HOSPITAL ENCOUNTER (OUTPATIENT)
Dept: MAMMOGRAPHY | Facility: HOSPITAL | Age: 45
Discharge: HOME | End: 2025-05-08
Admitting: OBSTETRICS & GYNECOLOGY
Payer: COMMERCIAL

## 2025-05-08 DIAGNOSIS — Z01.419 WOMEN'S ANNUAL ROUTINE GYNECOLOGICAL EXAMINATION: ICD-10-CM

## 2025-05-08 PROCEDURE — 77063 BREAST TOMOSYNTHESIS BI: CPT

## 2025-05-08 PROCEDURE — 77067 SCR MAMMO BI INCL CAD: CPT

## 2025-06-17 ENCOUNTER — OFFICE VISIT (OUTPATIENT)
Dept: FAMILY MEDICINE CLINIC | Facility: CLINIC | Age: 45
End: 2025-06-17
Payer: COMMERCIAL

## 2025-06-17 VITALS
DIASTOLIC BLOOD PRESSURE: 78 MMHG | HEART RATE: 74 BPM | TEMPERATURE: 96.9 F | BODY MASS INDEX: 38.59 KG/M2 | OXYGEN SATURATION: 99 % | HEIGHT: 65 IN | SYSTOLIC BLOOD PRESSURE: 130 MMHG | WEIGHT: 231.6 LBS

## 2025-06-17 DIAGNOSIS — G47.00 INSOMNIA, UNSPECIFIED TYPE: ICD-10-CM

## 2025-06-17 DIAGNOSIS — R79.89 ELEVATED PLATELET COUNT: ICD-10-CM

## 2025-06-17 DIAGNOSIS — I10 PRIMARY HYPERTENSION: ICD-10-CM

## 2025-06-17 DIAGNOSIS — E66.01 CLASS 2 SEVERE OBESITY WITH SERIOUS COMORBIDITY AND BODY MASS INDEX (BMI) OF 38.0 TO 38.9 IN ADULT, UNSPECIFIED OBESITY TYPE: ICD-10-CM

## 2025-06-17 DIAGNOSIS — E78.2 MIXED HYPERLIPIDEMIA: Chronic | ICD-10-CM

## 2025-06-17 DIAGNOSIS — Z00.00 ANNUAL PHYSICAL EXAM: Primary | ICD-10-CM

## 2025-06-17 DIAGNOSIS — E66.812 CLASS 2 SEVERE OBESITY WITH SERIOUS COMORBIDITY AND BODY MASS INDEX (BMI) OF 38.0 TO 38.9 IN ADULT, UNSPECIFIED OBESITY TYPE: ICD-10-CM

## 2025-06-17 DIAGNOSIS — R60.0 BILATERAL EDEMA OF LOWER EXTREMITY: ICD-10-CM

## 2025-06-17 LAB
BILIRUB BLD-MCNC: NEGATIVE MG/DL
CLARITY, POC: CLEAR
COLOR UR: YELLOW
EXPIRATION DATE: NORMAL
GLUCOSE UR STRIP-MCNC: NEGATIVE MG/DL
KETONES UR QL: NEGATIVE
LEUKOCYTE EST, POC: NEGATIVE
Lab: NORMAL
NITRITE UR-MCNC: NEGATIVE MG/ML
PH UR: 7 [PH] (ref 5–8)
PROT UR STRIP-MCNC: NEGATIVE MG/DL
RBC # UR STRIP: NEGATIVE /UL
SP GR UR: 1.02 (ref 1–1.03)
UROBILINOGEN UR QL: NORMAL

## 2025-06-17 PROCEDURE — 81003 URINALYSIS AUTO W/O SCOPE: CPT | Performed by: NURSE PRACTITIONER

## 2025-06-17 PROCEDURE — 99214 OFFICE O/P EST MOD 30 MIN: CPT | Performed by: NURSE PRACTITIONER

## 2025-06-17 PROCEDURE — 99396 PREV VISIT EST AGE 40-64: CPT | Performed by: NURSE PRACTITIONER

## 2025-06-17 RX ORDER — TRAZODONE HYDROCHLORIDE 50 MG/1
50 TABLET ORAL NIGHTLY PRN
Qty: 90 TABLET | Refills: 1 | Status: SHIPPED | OUTPATIENT
Start: 2025-06-17

## 2025-06-17 RX ORDER — CITALOPRAM HYDROBROMIDE 40 MG/1
40 TABLET ORAL DAILY
Qty: 90 TABLET | Refills: 1 | Status: SHIPPED | OUTPATIENT
Start: 2025-06-17

## 2025-06-17 RX ORDER — FUROSEMIDE 40 MG/1
40 TABLET ORAL DAILY
Qty: 30 TABLET | Refills: 0 | Status: SHIPPED | OUTPATIENT
Start: 2025-06-17

## 2025-06-17 RX ORDER — AMLODIPINE BESYLATE 5 MG/1
5 TABLET ORAL DAILY
Qty: 90 TABLET | Refills: 1 | Status: SHIPPED | OUTPATIENT
Start: 2025-06-17

## 2025-06-17 RX ORDER — FENOFIBRATE 160 MG/1
160 TABLET ORAL NIGHTLY
Qty: 90 TABLET | Refills: 1 | Status: SHIPPED | OUTPATIENT
Start: 2025-06-17

## 2025-06-17 RX ORDER — PHENTERMINE HYDROCHLORIDE 37.5 MG/1
37.5 CAPSULE ORAL EVERY MORNING
Qty: 30 CAPSULE | Refills: 0 | Status: SHIPPED | OUTPATIENT
Start: 2025-06-17

## 2025-06-17 NOTE — PROGRESS NOTES
Chief Complaint  Annual Exam, Hypertension, Hyperlipidemia, and Anxiety    Patient or patient representative verbalized consent for the use of Ambient Listening during the visit with  SERA Read for chart documentation. 6/17/2025  08:27 EDT    Subjective            Natalya Bynum is a 45 y.o. female who presents to [facility]   History of Present Illness  History of Present Illness  The patient presents today for an annual physical and follow-up.    She has discontinued the use of anti-inflammatory medications due to concerns about kidney function. Cholesterol screening was completed in 05/2025, with results indicating good cholesterol levels. However, kidney function showed a slight decline, which she attributes to previous medication use. She reports that her platelet count and white blood count were high during the last check, possibly due to an infection or allergy symptoms. She is currently on Lasix 20 mg daily and requests an increase in dosage due to persistent pedal edema, particularly in the afternoons.    She has not completed the Cologuard test and has expressed disinterest in doing so. She intended to schedule a colonoscopy but was unable to due to the recent passing of her brother and grandmother's . She has a referral for a colonoscopy from her gynecologist.    Routine vision exams are conducted every 1 to 2 years, and dental exams twice a year. She declines further COVID-19 vaccinations but receives the influenza vaccine annually. Her Tdap vaccination was administered in 2023. She undergoes annual mammograms due to family history. Pap smears are managed by her gynecologist.    She is currently on trazodone for sleep, fenofibrate 160 mg and simvastatin for cholesterol management, citalopram, Estrace estradiol tablets, and Wellbutrin for concentration difficulties. Methocarbamol has been discontinued, and she now uses Biofreeze roll-on for  "muscle relaxation and pain.    Blood pressure readings have been slightly elevated, and she is on amlodipine 5 mg for management. She requests a refill of phentermine for weight loss management because she is having difficulty currently.  She is working out at the gym and working on weight loss.    SOCIAL HISTORY  She quit smoking a couple of years ago. She does not drink alcohol.    FAMILY HISTORY  Her grandmother has diabetes, but she controls it without medication.  She has a family history of breast cancer.        Tobacco Use: Medium Risk (6/17/2025)    Patient History     Smoking Tobacco Use: Former     Smokeless Tobacco Use: Never     Passive Exposure: Not on file      E-cigarette/Vaping    E-cigarette/Vaping Use Never User      E-cigarette/Vaping Substances     E-cigarette/Vaping Devices       Alcohol Use: Not on file         Objective   Vital Signs:   Vitals:    06/17/25 0759 06/17/25 0845   BP: 135/91 130/78   BP Location: Left arm    Patient Position: Sitting    Cuff Size: Adult    Pulse: 74    Temp: 96.9 °F (36.1 °C)    SpO2: 99%    Weight: 105 kg (231 lb 9.6 oz)    Height: 165.1 cm (65\")    PainSc: 0-No pain      Body mass index is 38.54 kg/m².    Wt Readings from Last 3 Encounters:   06/17/25 105 kg (231 lb 9.6 oz)   05/05/25 101 kg (223 lb 3.2 oz)   04/22/25 104 kg (230 lb)     BP Readings from Last 3 Encounters:   06/17/25 130/78   05/05/25 126/87   04/22/25 126/86       Health Maintenance   Topic Date Due    ANNUAL PHYSICAL  05/30/2025    COLORECTAL CANCER SCREENING  06/18/2025 (Originally 1/16/2024)    COVID-19 Vaccine (4 - 2024-25 season) 11/04/2025 (Originally 9/1/2024)    INFLUENZA VACCINE  07/01/2025    LIPID PANEL  05/06/2026    MAMMOGRAM  05/08/2026    TDAP/TD VACCINES (2 - Td or Tdap) 07/18/2033    Pneumococcal Vaccine 0-49  Aged Out    HEPATITIS C SCREENING  Discontinued       /78   Pulse 74   Temp 96.9 °F (36.1 °C)   Ht 165.1 cm (65\")   Wt 105 kg (231 lb 9.6 oz)   SpO2 99%   BMI " 38.54 kg/m²       Current Outpatient Medications:     amLODIPine (NORVASC) 5 MG tablet, Take 1 tablet by mouth Daily. Indications: High Blood Pressure, Disp: 90 tablet, Rfl: 1    buPROPion XL (Wellbutrin XL) 150 MG 24 hr tablet, Take 1 tablet by mouth Every Morning. Indications: anxiety, focusing, Disp: 90 tablet, Rfl: 1    citalopram (CeleXA) 40 MG tablet, Take 1 tablet by mouth Daily., Disp: 90 tablet, Rfl: 1    estradiol (Estrace) 2 MG tablet, Take 1 tablet by mouth Daily., Disp: 90 tablet, Rfl: 3    fenofibrate 160 MG tablet, Take 1 tablet by mouth Every Night. Indications: Elevation of Both Cholesterol and Triglycerides in Blood, Disp: 90 tablet, Rfl: 1    furosemide (LASIX) 40 MG tablet, Take 1 tablet by mouth Daily. Indications: Edema, Disp: 30 tablet, Rfl: 0    hydrOXYzine (ATARAX) 25 MG tablet, Take 1 tablet by mouth 3 (Three) Times a Day As Needed for Anxiety. Caution drowsiness  Indications: Feeling Anxious, Disp: 30 tablet, Rfl: 0    simvastatin (ZOCOR) 40 MG tablet, Take 1 tablet by mouth Every Night. Indications: High Amount of Fats in the Blood, Disp: 90 tablet, Rfl: 1    traZODone (DESYREL) 50 MG tablet, Take 1 tablet by mouth At Night As Needed for Sleep. Indications: Trouble Sleeping, Disp: 90 tablet, Rfl: 1    phentermine 37.5 MG capsule, Take 1 capsule by mouth Every Morning. Indications: OBESITY, Disp: 30 capsule, Rfl: 0   Past Medical History:   Diagnosis Date    Abnormal Pap smear of cervix     Cervical dysplasia     H. pylori infection     HPV (human papilloma virus) infection     Hyperlipidemia     Hypertension 2023    LGSIL Pap smear of vagina 10/27/2022    Migraine     Obesity         Physical Exam  Vitals reviewed.   Constitutional:       Appearance: Normal appearance. She is well-developed. She is obese.   Neck:      Thyroid: No thyroid mass, thyromegaly or thyroid tenderness.   Cardiovascular:      Rate and Rhythm: Normal rate and regular rhythm.      Heart sounds: No murmur heard.      No friction rub. No gallop.      Comments: Trace pedal edema  Pulmonary:      Effort: Pulmonary effort is normal.      Breath sounds: Normal breath sounds. No wheezing or rhonchi.   Lymphadenopathy:      Cervical: No cervical adenopathy.   Skin:     General: Skin is warm and dry.   Neurological:      Mental Status: She is alert and oriented to person, place, and time.      Cranial Nerves: No cranial nerve deficit.   Psychiatric:         Mood and Affect: Mood and affect normal.         Behavior: Behavior normal.         Thought Content: Thought content normal. Thought content does not include homicidal or suicidal ideation.         Judgment: Judgment normal.       Physical Exam        Result Review :    The following data was reviewed by: SERA Read on 06/17/2025:      Mammo Screening Digital Tomosynthesis Bilateral With CAD  Result Date: 5/8/2025  No mammographic evidence of malignancy.  Recommend annual screening mammography.  BI-RADS ASSESSMENT: BI-RADS 1. Negative.  Note:  It has been reported that there is approximately a 15% false negative rate in mammography.  Therefore, management of a palpable abnormality should not be deferred because of a negative mammogram.  5/8/2025 8:47 AM by CELSA CARRILLO MD on Workstation: [employer]        Results  Labs   - Cholesterol levels: 05/2025, Normal   - Kidney function: 05/2025, Dropped slightly   - Platelet count: 05/2025, High   - Thyroid function: 05/2025, Normal   - Vitamin D levels: 05/2025, Normal   - White blood cell count: 05/2025, High   - Screening urinalysis was done today and was negative.  Assessment & Plan  Annual physical exam    Orders:    POCT urinalysis dipstick, automated    Primary hypertension      Orders:    amLODIPine (NORVASC) 5 MG tablet; Take 1 tablet by mouth Daily. Indications: High Blood Pressure    Bilateral edema of lower extremity    Orders:    furosemide (LASIX) 40 MG tablet; Take 1 tablet by mouth Daily. Indications: Edema     Basic Metabolic Panel; Future    Insomnia, unspecified type    Orders:    traZODone (DESYREL) 50 MG tablet; Take 1 tablet by mouth At Night As Needed for Sleep. Indications: Trouble Sleeping    Mixed hyperlipidemia       Orders:    fenofibrate 160 MG tablet; Take 1 tablet by mouth Every Night. Indications: Elevation of Both Cholesterol and Triglycerides in Blood    Elevated platelet count    Orders:    CBC w AUTO Differential; Future    Class 2 severe obesity with serious comorbidity and body mass index (BMI) of 38.0 to 38.9 in adult, unspecified obesity type  Patient's (Body mass index is 38.54 kg/m².) indicates that they are  with health conditions that include  . Weight is . BMI  . We discussed .     Orders:    phentermine 37.5 MG capsule; Take 1 capsule by mouth Every Morning. Indications: OBESITY       Assessment & Plan  1. Annual physical examination.  Her cholesterol levels are within the normal range. There is a slight decrease in kidney function, which could be attributed to her previous use of anti-inflammatory medications. An elevated platelet count was observed, although the cause remains uncertain as this is not a common occurrence in her case. Thyroid function and vitamin D levels are satisfactory. Blood glucose levels are well-controlled. Potassium levels are within the normal range. She has declined STD and HIV screenings. She is advised to undergo a mammogram annually due to her family history of breast cancer. She receives influenza vaccines annually and had one last fall. She received a Tdap vaccine in 2023, which is valid for 10 years. The Cologuard test order will be cancelled. A recheck of her platelet count and kidney function will be conducted in 1 week. A1c will also be checked. A prescription for phentermine will be provided for obesity to aid in weight loss.  She only wants to do 1 month but will come back in next month if she wants to continue phentermine longer.    2. Hypertension.  Her  blood pressure is slightly elevated at 135/91 mmHg. She is currently on amlodipine 5 mg. Her blood pressure will be rechecked before she leaves today.  Blood pressure was rechecked and was 130/78.    3. Edema.  She is on Lasix 20 mg daily for swelling in her feet, which helps inconsistently. The dosage of Lasix will be increased to 40 mg daily, with an additional dose to be taken in the early afternoon if swelling persists. Her potassium and kidney function will be rechecked in 1 week.  We discussed that she may need to have potassium supplement due to the increased dose of Lasix.  Advised to monitor for leg cramps.    Follow-up  The patient will follow up in 6 months.       Diagnosis Plan   1. Annual physical exam  POCT urinalysis dipstick, automated      2. Primary hypertension  amLODIPine (NORVASC) 5 MG tablet      3. Bilateral edema of lower extremity  furosemide (LASIX) 40 MG tablet    Basic Metabolic Panel      4. Insomnia, unspecified type  traZODone (DESYREL) 50 MG tablet      5. Mixed hyperlipidemia  fenofibrate 160 MG tablet      6. Elevated platelet count  CBC w AUTO Differential      7. Class 2 severe obesity with serious comorbidity and body mass index (BMI) of 38.0 to 38.9 in adult, unspecified obesity type  phentermine 37.5 MG capsule            FOLLOW UP  Return in about 6 months (around 12/17/2025) for Next scheduled follow up, labs in 1 week.  Patient was given instructions and counseling regarding her condition or for health maintenance advice. Please see specific information pulled into the AVS if appropriate.       CURRENT & DISCONTINUED MEDICATIONS  Current Outpatient Medications   Medication Instructions    amLODIPine (NORVASC) 5 mg, Oral, Daily    buPROPion XL (WELLBUTRIN XL) 150 mg, Oral, Every Morning    citalopram (CELEXA) 40 mg, Oral, Daily    estradiol (ESTRACE) 2 mg, Oral, Daily    fenofibrate 160 mg, Oral, Nightly    furosemide (LASIX) 40 mg, Oral, Daily    hydrOXYzine (ATARAX) 25 mg,  Oral, 3 Times Daily PRN, Caution drowsiness    phentermine 37.5 mg, Oral, Every Morning    simvastatin (ZOCOR) 40 mg, Oral, Nightly    traZODone (DESYREL) 50 mg, Oral, Nightly PRN       Medications Discontinued During This Encounter   Medication Reason    fenofibrate 160 MG tablet Reorder    amLODIPine (NORVASC) 5 MG tablet Reorder    traZODone (DESYREL) 50 MG tablet Reorder    citalopram (CeleXA) 40 MG tablet Reorder    furosemide (LASIX) 20 MG tablet Reorder    methocarbamol (ROBAXIN) 750 MG tablet *Therapy completed        Parts of this note are electronic transcriptions/translations of spoken language to printed text using the Dragon Dictation system.    SERA Read  06/17/25  10:24 EDT

## 2025-06-17 NOTE — ASSESSMENT & PLAN NOTE
Patient's (Body mass index is 38.54 kg/m².) indicates that they are  with health conditions that include  . Weight is . BMI  . We discussed .     Orders:    phentermine 37.5 MG capsule; Take 1 capsule by mouth Every Morning. Indications: OBESITY

## 2025-06-17 NOTE — ASSESSMENT & PLAN NOTE
Orders:    traZODone (DESYREL) 50 MG tablet; Take 1 tablet by mouth At Night As Needed for Sleep. Indications: Trouble Sleeping

## 2025-06-17 NOTE — ASSESSMENT & PLAN NOTE
Orders:    furosemide (LASIX) 40 MG tablet; Take 1 tablet by mouth Daily. Indications: Edema    Basic Metabolic Panel; Future

## 2025-06-17 NOTE — ASSESSMENT & PLAN NOTE
Orders:    amLODIPine (NORVASC) 5 MG tablet; Take 1 tablet by mouth Daily. Indications: High Blood Pressure

## 2025-06-17 NOTE — ASSESSMENT & PLAN NOTE
Orders:    fenofibrate 160 MG tablet; Take 1 tablet by mouth Every Night. Indications: Elevation of Both Cholesterol and Triglycerides in Blood

## 2025-07-01 ENCOUNTER — LAB (OUTPATIENT)
Dept: FAMILY MEDICINE CLINIC | Facility: CLINIC | Age: 45
End: 2025-07-01
Payer: COMMERCIAL

## 2025-07-01 DIAGNOSIS — R79.89 ELEVATED PLATELET COUNT: ICD-10-CM

## 2025-07-01 DIAGNOSIS — R60.0 BILATERAL EDEMA OF LOWER EXTREMITY: ICD-10-CM

## 2025-07-01 LAB
ANION GAP SERPL CALCULATED.3IONS-SCNC: 9.5 MMOL/L (ref 5–15)
BASOPHILS # BLD AUTO: 0.1 10*3/MM3 (ref 0–0.2)
BASOPHILS NFR BLD AUTO: 0.9 % (ref 0–1.5)
BUN SERPL-MCNC: 19 MG/DL (ref 6–20)
BUN/CREAT SERPL: 14.3 (ref 7–25)
CALCIUM SPEC-SCNC: 9.2 MG/DL (ref 8.6–10.5)
CHLORIDE SERPL-SCNC: 103 MMOL/L (ref 98–107)
CO2 SERPL-SCNC: 25.5 MMOL/L (ref 22–29)
CREAT SERPL-MCNC: 1.33 MG/DL (ref 0.57–1)
DEPRECATED RDW RBC AUTO: 40 FL (ref 37–54)
EGFRCR SERPLBLD CKD-EPI 2021: 50.1 ML/MIN/1.73
EOSINOPHIL # BLD AUTO: 0.26 10*3/MM3 (ref 0–0.4)
EOSINOPHIL NFR BLD AUTO: 2.3 % (ref 0.3–6.2)
ERYTHROCYTE [DISTWIDTH] IN BLOOD BY AUTOMATED COUNT: 12.3 % (ref 12.3–15.4)
GLUCOSE SERPL-MCNC: 81 MG/DL (ref 65–99)
HCT VFR BLD AUTO: 39 % (ref 34–46.6)
HGB BLD-MCNC: 12.9 G/DL (ref 12–15.9)
IMM GRANULOCYTES # BLD AUTO: 0.04 10*3/MM3 (ref 0–0.05)
IMM GRANULOCYTES NFR BLD AUTO: 0.4 % (ref 0–0.5)
LYMPHOCYTES # BLD AUTO: 2.96 10*3/MM3 (ref 0.7–3.1)
LYMPHOCYTES NFR BLD AUTO: 26.7 % (ref 19.6–45.3)
MCH RBC QN AUTO: 30 PG (ref 26.6–33)
MCHC RBC AUTO-ENTMCNC: 33.1 G/DL (ref 31.5–35.7)
MCV RBC AUTO: 90.7 FL (ref 79–97)
MONOCYTES # BLD AUTO: 0.91 10*3/MM3 (ref 0.1–0.9)
MONOCYTES NFR BLD AUTO: 8.2 % (ref 5–12)
NEUTROPHILS NFR BLD AUTO: 6.83 10*3/MM3 (ref 1.7–7)
NEUTROPHILS NFR BLD AUTO: 61.5 % (ref 42.7–76)
NRBC BLD AUTO-RTO: 0 /100 WBC (ref 0–0.2)
PLATELET # BLD AUTO: 424 10*3/MM3 (ref 140–450)
PMV BLD AUTO: 11.2 FL (ref 6–12)
POTASSIUM SERPL-SCNC: 4.5 MMOL/L (ref 3.5–5.2)
RBC # BLD AUTO: 4.3 10*6/MM3 (ref 3.77–5.28)
SODIUM SERPL-SCNC: 138 MMOL/L (ref 136–145)
WBC NRBC COR # BLD AUTO: 11.1 10*3/MM3 (ref 3.4–10.8)

## 2025-07-01 PROCEDURE — 85025 COMPLETE CBC W/AUTO DIFF WBC: CPT | Performed by: NURSE PRACTITIONER

## 2025-07-01 PROCEDURE — 36415 COLL VENOUS BLD VENIPUNCTURE: CPT | Performed by: NURSE PRACTITIONER

## 2025-07-01 PROCEDURE — 80048 BASIC METABOLIC PNL TOTAL CA: CPT | Performed by: NURSE PRACTITIONER

## 2025-07-16 DIAGNOSIS — R60.0 BILATERAL EDEMA OF LOWER EXTREMITY: ICD-10-CM

## 2025-07-16 RX ORDER — FUROSEMIDE 40 MG/1
40 TABLET ORAL DAILY
Qty: 30 TABLET | Refills: 0 | Status: SHIPPED | OUTPATIENT
Start: 2025-07-16

## 2025-07-30 DIAGNOSIS — R60.0 BILATERAL EDEMA OF LOWER EXTREMITY: ICD-10-CM

## 2025-07-30 RX ORDER — FUROSEMIDE 20 MG/1
TABLET ORAL
Qty: 90 TABLET | OUTPATIENT
Start: 2025-07-30